# Patient Record
Sex: FEMALE | Race: WHITE | NOT HISPANIC OR LATINO | Employment: OTHER | URBAN - METROPOLITAN AREA
[De-identification: names, ages, dates, MRNs, and addresses within clinical notes are randomized per-mention and may not be internally consistent; named-entity substitution may affect disease eponyms.]

---

## 2018-09-25 ENCOUNTER — TRANSCRIBE ORDERS (OUTPATIENT)
Dept: ADMINISTRATIVE | Age: 63
End: 2018-09-25

## 2018-09-25 ENCOUNTER — APPOINTMENT (OUTPATIENT)
Dept: RADIOLOGY | Age: 63
End: 2018-09-25
Payer: COMMERCIAL

## 2018-09-25 DIAGNOSIS — Z11.1 SCREENING EXAMINATION FOR PULMONARY TUBERCULOSIS: Primary | ICD-10-CM

## 2018-09-25 DIAGNOSIS — Z11.1 SCREENING EXAMINATION FOR PULMONARY TUBERCULOSIS: ICD-10-CM

## 2018-09-25 PROCEDURE — 71046 X-RAY EXAM CHEST 2 VIEWS: CPT

## 2021-04-13 DIAGNOSIS — Z23 ENCOUNTER FOR IMMUNIZATION: ICD-10-CM

## 2023-01-22 PROBLEM — M18.0 PRIMARY OSTEOARTHRITIS OF BOTH FIRST CARPOMETACARPAL JOINTS: Status: ACTIVE | Noted: 2023-01-22

## 2023-01-22 PROBLEM — L40.0 PLAQUE PSORIASIS: Status: ACTIVE | Noted: 2023-01-22

## 2023-01-22 PROBLEM — M85.89 OSTEOPENIA OF MULTIPLE SITES: Status: ACTIVE | Noted: 2023-01-22

## 2023-01-22 PROBLEM — M06.09 SERONEGATIVE ARTHROPATHY OF MULTIPLE SITES (HCC): Status: ACTIVE | Noted: 2023-01-22

## 2023-01-22 PROBLEM — E03.8 HYPOTHYROIDISM DUE TO HASHIMOTO'S THYROIDITIS: Status: ACTIVE | Noted: 2023-01-22

## 2023-01-22 PROBLEM — M15.0 PRIMARY GENERALIZED (OSTEO)ARTHRITIS: Status: ACTIVE | Noted: 2023-01-22

## 2023-01-22 PROBLEM — K21.9 GASTROESOPHAGEAL REFLUX DISEASE WITHOUT ESOPHAGITIS: Status: ACTIVE | Noted: 2023-01-22

## 2023-01-22 PROBLEM — I10 PRIMARY HYPERTENSION: Status: ACTIVE | Noted: 2023-01-22

## 2023-01-22 PROBLEM — J30.89 ENVIRONMENTAL AND SEASONAL ALLERGIES: Status: ACTIVE | Noted: 2023-01-22

## 2023-01-22 PROBLEM — E06.3 HYPOTHYROIDISM DUE TO HASHIMOTO'S THYROIDITIS: Status: ACTIVE | Noted: 2023-01-22

## 2023-01-22 PROBLEM — E78.5 HYPERLIPIDEMIA: Status: ACTIVE | Noted: 2023-01-22

## 2023-01-22 PROBLEM — N39.3 URINARY, INCONTINENCE, STRESS FEMALE: Status: ACTIVE | Noted: 2023-01-22

## 2023-01-22 PROBLEM — G25.0 ESSENTIAL TREMOR: Status: ACTIVE | Noted: 2023-01-22

## 2023-01-22 PROBLEM — M47.812 CERVICAL SPONDYLOSIS WITHOUT MYELOPATHY: Status: ACTIVE | Noted: 2023-01-22

## 2023-01-22 PROBLEM — M35.00 SJOGREN'S SYNDROME WITHOUT EXTRAGLANDULAR INVOLVEMENT (HCC): Status: ACTIVE | Noted: 2023-01-22

## 2023-11-06 ENCOUNTER — OFFICE VISIT (OUTPATIENT)
Dept: OBGYN CLINIC | Facility: CLINIC | Age: 68
End: 2023-11-06
Payer: COMMERCIAL

## 2023-11-06 VITALS
BODY MASS INDEX: 24.99 KG/M2 | HEIGHT: 65 IN | SYSTOLIC BLOOD PRESSURE: 124 MMHG | WEIGHT: 150 LBS | DIASTOLIC BLOOD PRESSURE: 84 MMHG

## 2023-11-06 DIAGNOSIS — N83.201 RIGHT OVARIAN CYST: Primary | ICD-10-CM

## 2023-11-06 PROCEDURE — 99203 OFFICE O/P NEW LOW 30 MIN: CPT | Performed by: STUDENT IN AN ORGANIZED HEALTH CARE EDUCATION/TRAINING PROGRAM

## 2023-11-06 NOTE — PROGRESS NOTES
Assessment/Plan:     Problem List Items Addressed This Visit    None  Visit Diagnoses       Right ovarian cyst    -  Primary    Relevant Orders    Ambulatory Referral to Gynecologic Oncology          - reviewed potential etiologies of ovarian or paraovarian cyst in postmenopausal woman. Recommend removal. As I am not able to look at images from recently performed ultrasound, I cannot give her a more personalized opinion of her risk of malignancy. However, given size, cyst with "internal debris," I recommend gyn/onc referral and removal.     RTO routine gyn care     Subjective:      Patient ID: Nola Pisano is a 70 y.o. female. HPI  She presents today for discussion regarding paraovarian cyst, found during evaluation for pelvic pain. The following portions of the patient's history were reviewed and updated as appropriate: allergies, current medications, past family history, past medical history, past social history, past surgical history, and problem list.    Review of Systems  neg as reviewed    Objective:  /84 (BP Location: Left arm, Patient Position: Sitting, Cuff Size: Standard)   Ht 5' 4.5" (1.638 m)   Wt 68 kg (150 lb)   BMI 25.35 kg/m²      Physical Exam  Vitals reviewed. Constitutional:       Appearance: She is well-developed. HENT:      Head: Normocephalic. Cardiovascular:      Rate and Rhythm: Normal rate. Pulmonary:      Effort: Pulmonary effort is normal.   Musculoskeletal:         General: Normal range of motion. Cervical back: Normal range of motion. Neurological:      Mental Status: She is alert and oriented to person, place, and time.    Psychiatric:         Behavior: Behavior normal.

## 2023-11-07 ENCOUNTER — TELEPHONE (OUTPATIENT)
Dept: HEMATOLOGY ONCOLOGY | Facility: CLINIC | Age: 68
End: 2023-11-07

## 2023-11-07 NOTE — TELEPHONE ENCOUNTER
I called [facility] in response to a referral that was received for patient to establish care with Gynecologic Oncology. Outreach was made to schedule a consultation. I left a voicemail explaining the reason for my call and advised patient to call [facility] at [phone]. Another attempt will be made to contact patient.     7 days not needed

## 2023-11-07 NOTE — TELEPHONE ENCOUNTER
Appointment Schedule   Who are you speaking with? Patient   If it is not the patient, are they listed on an active communication consent form? N/A   Which provider is the appointment scheduled with? Dr. Corona Ellis   At which location is the appointment scheduled for? [place]   When is the appointment scheduled? Please list date and time 11/27/23 859   What is the reason for this appointment? consult   Did patient voice understanding of the details of this appointment? Yes   Was the no show policy reviewed with patient?  Yes

## 2023-11-22 ENCOUNTER — TELEPHONE (OUTPATIENT)
Dept: HEMATOLOGY ONCOLOGY | Facility: CLINIC | Age: 68
End: 2023-11-22

## 2023-11-27 ENCOUNTER — OFFICE VISIT (OUTPATIENT)
Dept: GYNECOLOGIC ONCOLOGY | Facility: CLINIC | Age: 68
End: 2023-11-27
Payer: COMMERCIAL

## 2023-11-27 VITALS
DIASTOLIC BLOOD PRESSURE: 86 MMHG | BODY MASS INDEX: 25.52 KG/M2 | HEART RATE: 102 BPM | SYSTOLIC BLOOD PRESSURE: 142 MMHG | TEMPERATURE: 99.5 F | OXYGEN SATURATION: 98 % | WEIGHT: 151 LBS

## 2023-11-27 DIAGNOSIS — R19.00 PELVIC MASS: Primary | ICD-10-CM

## 2023-11-27 DIAGNOSIS — N83.201 RIGHT OVARIAN CYST: ICD-10-CM

## 2023-11-27 PROCEDURE — 99205 OFFICE O/P NEW HI 60 MIN: CPT | Performed by: OBSTETRICS & GYNECOLOGY

## 2023-11-27 PROCEDURE — 87624 HPV HI-RISK TYP POOLED RSLT: CPT | Performed by: OBSTETRICS & GYNECOLOGY

## 2023-11-27 PROCEDURE — 88175 CYTOPATH C/V AUTO FLUID REDO: CPT | Performed by: OBSTETRICS & GYNECOLOGY

## 2023-11-27 RX ORDER — SODIUM CHLORIDE, SODIUM LACTATE, POTASSIUM CHLORIDE, CALCIUM CHLORIDE 600; 310; 30; 20 MG/100ML; MG/100ML; MG/100ML; MG/100ML
125 INJECTION, SOLUTION INTRAVENOUS CONTINUOUS
OUTPATIENT
Start: 2023-11-27

## 2023-11-27 RX ORDER — HEPARIN SODIUM 5000 [USP'U]/ML
5000 INJECTION, SOLUTION INTRAVENOUS; SUBCUTANEOUS
OUTPATIENT
Start: 2023-11-27 | End: 2023-11-28

## 2023-11-27 RX ORDER — ACETAMINOPHEN 325 MG/1
975 TABLET ORAL ONCE
OUTPATIENT
Start: 2023-11-27 | End: 2023-11-27

## 2023-11-27 RX ORDER — CEFAZOLIN SODIUM 1 G/50ML
1000 SOLUTION INTRAVENOUS ONCE
OUTPATIENT
Start: 2023-11-27 | End: 2023-11-27

## 2023-11-27 NOTE — PROGRESS NOTES
Assessment/Plan:    Problem List Items Addressed This Visit          Other    Pelvic mass - Primary     70year-old with an 8.2 x 4.9 cm right adnexal cyst.  There was debris in the cyst by ultrasound. Her performance status is 0.  1.  I discussed the differential diagnosis of the adnexal mass including benign, borderline, malignant etiologies. 2.  CT abdomen pelvis and  to evaluate for metastatic disease  3. I discussed the risks and benefits of robotic assisted total laparoscopic hysterectomy, bilateral salpingo-oophorectomy, intraoperative frozen section analysis with possible staging including omentectomy, peritoneal biopsies, lymphadenectomy. She understands the risks and benefits of the operation and agrees to proceed as outlined. Consent for surgery was obtained by me in the office. 4.  She understands that adjuvant therapy for malignancy is based on histology and stage but that chemotherapy is often recommended even for early stage disease. Thank you for the courtesy of this consultation. All questions were answered by the end of the visit. Relevant Orders    CBC and differential    Comprehensive metabolic panel        CT abdomen pelvis w contrast    Case request operating room: HYSTERECTOMY LAPAROSCOPIC TOTAL ([address] [place] [address]) W/ ROBOTICS (Completed)    Type and screen    HEMOGLOBIN A1C W/ EAG ESTIMATION    TSH    EKG 12 lead    XR chest pa & lateral    Liquid-based pap, diagnostic     Other Visit Diagnoses       Right ovarian cyst                    CHIEF COMPLAINT: Right adnexal mass        Patient ID: Mady Molina is a 70 y.o. female  Who presents as a consultation from Dr. Tone Mclaughlin to discuss treatment options for an incidentally identified pelvic mass. She was having diarrhea and had a work-up that included a ultrasound of the pelvis. This was done on 9/13/2023 and revealed the uterus to measure 6.9 x 4.9 cm with a 4 mm endometrial thickness. There was no free fluid.   There was a right incompletely visualized adnexal mass measuring 8.2 x 4.9 cm with debris in the cyst.  She does not have postmenopausal bleeding. She has not had a Pap smear. She does not have pelvic pain. She is able to perform her activities of daily living without difficulty. She stated she had a prior laparoscopy for a fimbrial cyst approximately 20 years ago. Review of Systems   Constitutional:  Negative for activity change and unexpected weight change. HENT: Negative. Eyes: Negative. Respiratory: Negative. Cardiovascular: Negative. Gastrointestinal:  Negative for abdominal distention and abdominal pain. Endocrine: Negative. Genitourinary:  Negative for pelvic pain and vaginal bleeding. Musculoskeletal: Negative. Skin: Negative. Allergic/Immunologic: Negative. Neurological: Negative. Hematological: Negative. Psychiatric/Behavioral: Negative.          Current Outpatient Medications   Medication Sig Dispense Refill    Ascorbic Acid (Vitamin C) 500 MG CAPS Take by mouth      B Complex Vitamins (B COMPLEX PO) Take by mouth      Boswellia-Glucosamine-Vit D (OSTEO BI-FLEX ONE PER DAY PO) Take by mouth      CALCIUM CITRATE PO Take by mouth      Cholecalciferol (Vitamin D-3) 125 MCG (5000 UT) TABS Take by mouth      levothyroxine 50 mcg tablet Take 50 mcg by mouth 3 times a week      Levothyroxine Sodium 75 MCG CAPS Take 3 capsules by mouth 4 times a week      Magnesium 250 MG TABS Take by mouth      MILK THISTLE PO Take by mouth      Omega-3 Fatty Acids (fish oil) 1,000 mg Take 2 capsules by mouth daily      Turmeric Curcumin 500 MG CAPS Take by mouth      Zinc 20 MG CAPS Take 40 mg by mouth      Acetylcysteine ( PO) Take by mouth (Patient not taking: Reported on 3/28/2023)      Alpha-Lipoic Acid (LIPOIC ACID PO) Take by mouth in the morning (Patient not taking: Reported on 10/31/2023)      fluticasone (FLONASE) 50 mcg/act nasal spray 2 sprays into each nostril daily (Patient not taking: Reported on 3/28/2023)      Steffanie, Zingiber officinalis, (STEFFANIE PO) Take by mouth (Patient not taking: Reported on 3/28/2023)      Neomycin-Polymyxin-Gramicidin (NEOSPORIN OP) Apply to eye (Patient not taking: Reported on 3/28/2023)       No current facility-administered medications for this visit. Allergies   Allergen Reactions    Ciprofloxacin Other (See Comments)     Nightmares    Dust Mite Extract Other (See Comments)    Molds & Smuts Other (See Comments)    Mushroom Extract Complex - Food Allergy Other (See Comments)    Shrimp Extract Allergy Skin Test - Food Allergy Other (See Comments)    Wheat Bran - Food Allergy Other (See Comments)       Past Medical History:   Diagnosis Date    Anxiety and depression     Dry eyes     Environmental and seasonal allergies     Hyperlipidemia     Hypertension     Hypothyroidism     Hashimoto's    Osteoarthritis     Osteopenia     Psoriasis     Scalp    Urinary, incontinence, stress female        Past Surgical History:   Procedure Laterality Date    ADENOIDECTOMY      APPENDECTOMY N/A     BREAST MASS EXCISION Left     Benign    INGUINAL HERNIA REPAIR Left     TUBAL LIGATION         OB History    No obstetric history on file.          Family History   Problem Relation Age of Onset    Cervical cancer Mother     Hypothyroidism Mother     Migraines Mother     Heart disease Mother     Gallbladder disease Mother     Peripheral vascular disease Mother     Arthritis Mother     Osteoporosis Mother     Stroke Father     Hypertension Father     Depression Sister     Cancer Sister         Bladder    Hypertension Brother     Hyperlipidemia Brother     Coronary artery disease Brother     Psoriasis Brother     Hypertension Brother     Hyperlipidemia Brother        The following portions of the patient's history were reviewed and updated as appropriate: allergies, current medications, past family history, past medical history, past social history, past surgical history, and problem list.      Objective:    Blood pressure 142/86, pulse 102, temperature 99.5 °F (37.5 °C), temperature source Temporal, weight 68.5 kg (151 lb), SpO2 98 %. Body mass index is 25.52 kg/m². Physical Exam  Vitals reviewed. Exam conducted with a chaperone present. Constitutional:       General: She is not in acute distress. Appearance: Normal appearance. She is well-developed and normal weight. She is not ill-appearing, toxic-appearing or diaphoretic. HENT:      Head: Normocephalic and atraumatic. Eyes:      General: No scleral icterus. Extraocular Movements: Extraocular movements intact. Conjunctiva/sclera: Conjunctivae normal.   Neck:      Thyroid: No thyromegaly. Cardiovascular:      Rate and Rhythm: Normal rate and regular rhythm. Pulmonary:      Effort: Pulmonary effort is normal.      Breath sounds: Normal breath sounds. Abdominal:      General: There is no distension. Palpations: Abdomen is soft. There is no mass. Tenderness: There is no abdominal tenderness. There is no guarding or rebound. Hernia: No hernia is present. Genitourinary:     Comments: The external female genitalia is normal. The bartholin's, uretheral and skenes glands are normal. The urethral meatus is normal (midline with no lesions). Anus without fissure or lesion. Speculum exam reveals vagina without lesion or discharge. Cervix is normal appearing without visible lesions. Pap smear obtained. No bleeding. No significant cystocele or rectocele noted. Bimanual exam notes a uterus with normal contour, mobility. No tenderness. Adnexa without masses or tenderness. Bladder is without fullness, mass or tenderness. Musculoskeletal:         General: No swelling or tenderness. Cervical back: Normal range of motion and neck supple. Right lower leg: No edema. Left lower leg: No edema. Lymphadenopathy:      Cervical: No cervical adenopathy.    Skin:     General: Skin is warm and dry. Coloration: Skin is not jaundiced or pale. Findings: No lesion or rash. Neurological:      General: No focal deficit present. Mental Status: She is alert and oriented to person, place, and time. Mental status is at baseline. Cranial Nerves: No cranial nerve deficit. Motor: No weakness. Gait: Gait normal.   Psychiatric:         Mood and Affect: Mood normal.         Behavior: Behavior normal.         Thought Content:  Thought content normal.         Judgment: Judgment normal.

## 2023-11-27 NOTE — PATIENT INSTRUCTIONS
1. Nothing to eat or drink after midnight prior to the operation. 2.  Please avoid ibuprofen, aspirin, fish oil for 7 days prior to surgery  3. You may take your thyroid medicine the morning of surgery with a sip of water. Do not take any other medications the morning of surgery.

## 2023-11-27 NOTE — ASSESSMENT & PLAN NOTE
70year-old with an 8.2 x 4.9 cm right adnexal cyst.  There was debris in the cyst by ultrasound. Her performance status is 0.  1.  I discussed the differential diagnosis of the adnexal mass including benign, borderline, malignant etiologies. 2.  CT abdomen pelvis and  to evaluate for metastatic disease  3. I discussed the risks and benefits of robotic assisted total laparoscopic hysterectomy, bilateral salpingo-oophorectomy, intraoperative frozen section analysis with possible staging including omentectomy, peritoneal biopsies, lymphadenectomy. She understands the risks and benefits of the operation and agrees to proceed as outlined. Consent for surgery was obtained by me in the office. 4.  She understands that adjuvant therapy for malignancy is based on histology and stage but that chemotherapy is often recommended even for early stage disease. Thank you for the courtesy of this consultation. All questions were answered by the end of the visit.

## 2023-11-29 LAB
HPV HR 12 DNA CVX QL NAA+PROBE: NEGATIVE
HPV16 DNA CVX QL NAA+PROBE: NEGATIVE
HPV18 DNA CVX QL NAA+PROBE: NEGATIVE

## 2023-12-01 ENCOUNTER — TELEPHONE (OUTPATIENT)
Dept: HEMATOLOGY ONCOLOGY | Facility: CLINIC | Age: 68
End: 2023-12-01

## 2023-12-01 NOTE — TELEPHONE ENCOUNTER
Patient Call    Who are you speaking with? Patient    If it is not the patient, are they listed on an active communication consent form? N/A   What is the reason for this call? Patient would like to speak to the office regarding some questions about her surgery and her CT scan results. Does this require a call back? Yes   If a call back is required, please list [facility] call back number [phone]   If a call back is required, advise that a message will be forwarded to their care team and someone will return their call as soon as possible. Did you relay this information to the patient?  Yes

## 2023-12-02 ENCOUNTER — HOSPITAL ENCOUNTER (EMERGENCY)
Facility: HOSPITAL | Age: 68
Discharge: HOME | End: 2023-12-02
Attending: EMERGENCY MEDICINE | Admitting: EMERGENCY MEDICINE
Payer: COMMERCIAL

## 2023-12-02 ENCOUNTER — APPOINTMENT (EMERGENCY)
Dept: RADIOLOGY | Facility: HOSPITAL | Age: 68
End: 2023-12-02
Payer: COMMERCIAL

## 2023-12-02 VITALS
OXYGEN SATURATION: 98 % | DIASTOLIC BLOOD PRESSURE: 80 MMHG | SYSTOLIC BLOOD PRESSURE: 123 MMHG | HEART RATE: 89 BPM | TEMPERATURE: 98.5 F | RESPIRATION RATE: 18 BRPM

## 2023-12-02 DIAGNOSIS — R10.9 ABDOMINAL PAIN: ICD-10-CM

## 2023-12-02 DIAGNOSIS — R07.9 CHEST PAIN: Primary | ICD-10-CM

## 2023-12-02 DIAGNOSIS — R13.10 DYSPHAGIA: ICD-10-CM

## 2023-12-02 LAB
ALBUMIN SERPL BCP-MCNC: 4.3 G/DL (ref 3.5–5)
ALP SERPL-CCNC: 50 U/L (ref 34–104)
ALT SERPL W P-5'-P-CCNC: 11 U/L (ref 7–52)
ANION GAP SERPL CALCULATED.3IONS-SCNC: 8 MMOL/L
AST SERPL W P-5'-P-CCNC: 14 U/L (ref 13–39)
BACTERIA UR QL AUTO: ABNORMAL /HPF
BASOPHILS # BLD AUTO: 0.04 THOUSANDS/ÂΜL (ref 0–0.1)
BASOPHILS NFR BLD AUTO: 0 % (ref 0–1)
BILIRUB SERPL-MCNC: 0.66 MG/DL (ref 0.2–1)
BILIRUB UR QL STRIP: NEGATIVE
BUN SERPL-MCNC: 14 MG/DL (ref 5–25)
CALCIUM SERPL-MCNC: 9.3 MG/DL (ref 8.4–10.2)
CARDIAC TROPONIN I PNL SERPL HS: 3 NG/L
CHLORIDE SERPL-SCNC: 98 MMOL/L (ref 96–108)
CLARITY UR: CLEAR
CO2 SERPL-SCNC: 26 MMOL/L (ref 21–32)
COLOR UR: YELLOW
CREAT SERPL-MCNC: 0.98 MG/DL (ref 0.6–1.3)
EOSINOPHIL # BLD AUTO: 0 THOUSAND/ÂΜL (ref 0–0.61)
EOSINOPHIL NFR BLD AUTO: 0 % (ref 0–6)
ERYTHROCYTE [DISTWIDTH] IN BLOOD BY AUTOMATED COUNT: 12.1 % (ref 11.6–15.1)
GFR SERPL CREATININE-BSD FRML MDRD: 59 ML/MIN/1.73SQ M
GLUCOSE SERPL-MCNC: 124 MG/DL (ref 65–140)
GLUCOSE UR STRIP-MCNC: NEGATIVE MG/DL
HCT VFR BLD AUTO: 39 % (ref 34.8–46.1)
HGB BLD-MCNC: 13 G/DL (ref 11.5–15.4)
HGB UR QL STRIP.AUTO: NEGATIVE
IMM GRANULOCYTES # BLD AUTO: 0.04 THOUSAND/UL (ref 0–0.2)
IMM GRANULOCYTES NFR BLD AUTO: 0 % (ref 0–2)
KETONES UR STRIP-MCNC: ABNORMAL MG/DL
LEUKOCYTE ESTERASE UR QL STRIP: NEGATIVE
LYMPHOCYTES # BLD AUTO: 0.93 THOUSANDS/ÂΜL (ref 0.6–4.47)
LYMPHOCYTES NFR BLD AUTO: 8 % (ref 14–44)
MCH RBC QN AUTO: 31.6 PG (ref 26.8–34.3)
MCHC RBC AUTO-ENTMCNC: 33.3 G/DL (ref 31.4–37.4)
MCV RBC AUTO: 95 FL (ref 82–98)
MONOCYTES # BLD AUTO: 0.59 THOUSAND/ÂΜL (ref 0.17–1.22)
MONOCYTES NFR BLD AUTO: 5 % (ref 4–12)
MUCOUS THREADS UR QL AUTO: ABNORMAL
NEUTROPHILS # BLD AUTO: 10.12 THOUSANDS/ÂΜL (ref 1.85–7.62)
NEUTS SEG NFR BLD AUTO: 87 % (ref 43–75)
NITRITE UR QL STRIP: NEGATIVE
NON-SQ EPI CELLS URNS QL MICRO: ABNORMAL /HPF
NRBC BLD AUTO-RTO: 0 /100 WBCS
PH UR STRIP.AUTO: 6 [PH]
PLATELET # BLD AUTO: 419 THOUSANDS/UL (ref 149–390)
PMV BLD AUTO: 9.3 FL (ref 8.9–12.7)
POTASSIUM SERPL-SCNC: 3.7 MMOL/L (ref 3.5–5.3)
PROT SERPL-MCNC: 7.8 G/DL (ref 6.4–8.4)
PROT UR STRIP-MCNC: ABNORMAL MG/DL
RBC # BLD AUTO: 4.11 MILLION/UL (ref 3.81–5.12)
RBC #/AREA URNS AUTO: ABNORMAL /HPF
SODIUM SERPL-SCNC: 132 MMOL/L (ref 135–147)
SP GR UR STRIP.AUTO: 1.02 (ref 1–1.03)
UROBILINOGEN UR STRIP-ACNC: <2 MG/DL
WBC # BLD AUTO: 11.72 THOUSAND/UL (ref 4.31–10.16)
WBC #/AREA URNS AUTO: ABNORMAL /HPF

## 2023-12-02 PROCEDURE — C9113 INJ PANTOPRAZOLE SODIUM, VIA: HCPCS

## 2023-12-02 PROCEDURE — 81001 URINALYSIS AUTO W/SCOPE: CPT

## 2023-12-02 PROCEDURE — 96374 THER/PROPH/DIAG INJ IV PUSH: CPT

## 2023-12-02 PROCEDURE — 36415 COLL VENOUS BLD VENIPUNCTURE: CPT

## 2023-12-02 PROCEDURE — 80053 COMPREHEN METABOLIC PANEL: CPT | Performed by: EMERGENCY MEDICINE

## 2023-12-02 PROCEDURE — 99285 EMERGENCY DEPT VISIT HI MDM: CPT | Performed by: EMERGENCY MEDICINE

## 2023-12-02 PROCEDURE — 93005 ELECTROCARDIOGRAM TRACING: CPT

## 2023-12-02 PROCEDURE — 84484 ASSAY OF TROPONIN QUANT: CPT | Performed by: EMERGENCY MEDICINE

## 2023-12-02 PROCEDURE — 99284 EMERGENCY DEPT VISIT MOD MDM: CPT

## 2023-12-02 PROCEDURE — 71046 X-RAY EXAM CHEST 2 VIEWS: CPT

## 2023-12-02 PROCEDURE — 85025 COMPLETE CBC W/AUTO DIFF WBC: CPT | Performed by: EMERGENCY MEDICINE

## 2023-12-02 RX ORDER — SUCRALFATE ORAL 1 G/10ML
1 SUSPENSION ORAL ONCE
Status: DISCONTINUED | OUTPATIENT
Start: 2023-12-02 | End: 2023-12-02

## 2023-12-02 RX ORDER — PANTOPRAZOLE SODIUM 40 MG/10ML
40 INJECTION, POWDER, LYOPHILIZED, FOR SOLUTION INTRAVENOUS ONCE
Status: COMPLETED | OUTPATIENT
Start: 2023-12-02 | End: 2023-12-02

## 2023-12-02 RX ORDER — OMEPRAZOLE 20 MG/1
20 CAPSULE, DELAYED RELEASE ORAL DAILY
Qty: 7 CAPSULE | Refills: 0 | Status: SHIPPED | OUTPATIENT
Start: 2023-12-02

## 2023-12-02 RX ORDER — SUCRALFATE 1 G/1
1 TABLET ORAL ONCE
Status: COMPLETED | OUTPATIENT
Start: 2023-12-02 | End: 2023-12-02

## 2023-12-02 RX ORDER — SUCRALFATE 1 G/1
1 TABLET ORAL 4 TIMES DAILY PRN
Qty: 12 TABLET | Refills: 0 | Status: SHIPPED | OUTPATIENT
Start: 2023-12-02 | End: 2023-12-07

## 2023-12-02 RX ADMIN — SUCRALFATE 1 G: 1 TABLET ORAL at 15:44

## 2023-12-02 RX ADMIN — PANTOPRAZOLE SODIUM 40 MG: 40 INJECTION, POWDER, FOR SOLUTION INTRAVENOUS at 15:44

## 2023-12-02 NOTE — DISCHARGE INSTRUCTIONS
Please call your GI doctor on Monday to set up an appointment. I have also put in a referral to our system for them to call you and make an appointment, if you are unable to contact your physician. Please return to the ED if you experience worsening chest pain, difficulty breathing, or confusion. Also given a referral to cardiology due to having some increased risk factors for potential heart problems in the future. Please contact cardiology to set up an appointment.

## 2023-12-02 NOTE — ED ATTENDING ATTESTATION
12/2/2023  I, Sanford Cesar MD, saw and evaluated the patient. I have discussed the patient with the resident/non-physician practitioner and agree with the resident's/non-physician practitioner's findings, Plan of Care, and MDM as documented in the resident's/non-physician practitioner's note, except where noted. All available labs and Radiology studies were reviewed. I was present for key portions of any procedure(s) performed by the resident/non-physician practitioner and I was immediately available to provide assistance. At this point I agree with the current assessment done in the Emergency Department. I have conducted an independent evaluation of this patient a history and physical is as follows:    History    Patient is a 16-year-old female, with a history significant for anxiety, hypertension, hyperlipidemia, who presents to the ED today due to epigastric abdominal pain/substernal chest pain that has been present, atraumatically and intermittently, over the last month. Patient describes this as a burning/gnawing sensation that is exacerbated by laying flat as well as by eating acidic foods. This discomfort radiates up her chest when particularly severe. Patient has taken Pepcid to remit her symptoms. Patient denies frequent NSAID use. Patient denies fever, dyspnea, history of VTE, hemoptysis, recent trauma or surgery, melena, hematochezia, hematemesis, unintentional weight loss, diarrhea since her most recent bout was treated. Patient does, however, report dysphagia to solids but not liquids. Patient believes that the Pepcid has helped with her symptoms since beginning this roughly 1 week ago. There is associated bloating/flatulence/burping. Patient also describes polyuria without dysuria. Patient states that, in between episodes, the pain is completely resolved. Patient is without other concerns at this time.      ROS  Patient denies: Fever; vision change;dyspnea; dysuria; rash; weakness; numbness; difficulty walking; confusion    Physical Exam    GENERAL APPEARANCE: NAD  NEURO: Patient is speaking clearly in complete sentences. Patient is answering appropriately and able follow commands. Patient is moving all four extremities spontaneously. No facial droop. Tongue midline. HEENT: PERRL, Moist mucous membranes, external ears normal, nose normal  Neck: No cervical adenopathy  CV: RRR. No murmurs, rubs, gallops  LUNGS: Clear to auscultation: No wheezes, stridor, rhonchi, rales  GI: Abdomen non-distended. Soft. Non-tender and without rebound or guarding. Negative Kaur sign. : Deferred at this time  MSK: No deformity. No lower extremity edema. No pain with calf squeeze. Skin: Warm and dry  Capillary refill: <2 seconds    Assessment/Plan  Abdominal pain, chest pain, dysphagia, polyuria  -Concern for GERD versus Loredo's esophagus versus gastritis versus peptic ulcer disease versus ACS versus anemia versus electrolyte abnormality versus cancer versus UTI. -Will investigate with cardiac workup, UA  -Will manage with Protonix, Carafate, further based upon workup. Patient advised to follow-up with her gastroenterologist this coming Monday    ED Course  ED Course as of 12/02/23 1609   Sat Dec 02, 2023   1456 hs TnI 0hr: 3  WNL   1456 Sodium(!): 132  Low, similar to prior    1457 Hemoglobin: 13.0  WNL, similar to prior per my review of the medical record    1457 ECG per my independent interpretation: Tachycardic rate, regular rhythm, no ectopy, normal axis, no ST elevations or depressions     1603 On reevaluation, patient reports significant improvement in pain at this time and there is no pleuritic component at this time.    1605 Bacteria, UA: None Seen  WNL   1605 hs TnI 0hr: 3  Within normal limits, as pain has been present for over 3 hours, no need for delta         Critical Care Time  Procedures

## 2023-12-02 NOTE — ED PROVIDER NOTES
History  Chief Complaint   Patient presents with    Heartburn     Pt c/o midsternal "burning" pain since November. Taking pepcid and simethicone at home without relief. +bloating and belching      Ms. Rossi Castillo is a 79-year-old female with a past medical history of anxiety, HLD, HTN, hypothyroidism, osteoarthritis, and psoriasis who presents to the ED for chest pain. Patient reports that for about the past month she has had what she believes to be acid reflux. She reports that she has a lot of belching, and on days when she has belching and is bloated she almost always gets significant chest pain when she lays down to go to sleep. She describes the pain as a burning sensation. She reports that if she sits up straight at 90 degrees the pain stops worsening. And then eventually after several hours the pain will lessen as long as she stays sitting up. Due to this she has lost quite a bit of sleep. She reports that roughly a week ago she began taking Pepcid, 20 mg twice daily. Reports that she had some initial symptom relief with use of Pepcid, but reports that she feels that her symptoms are continuing to worsen. Notes that she has some dysphagia with solid foods, but no difficulties with liquids. Prior to Admission Medications   Prescriptions Last Dose Informant Patient Reported? Taking?    Acetylcysteine ( PO)  Self Yes No   Sig: Take by mouth   Patient not taking: Reported on 3/28/2023   Alpha-Lipoic Acid (LIPOIC ACID PO)  Self Yes No   Sig: Take by mouth in the morning   Patient not taking: Reported on 10/31/2023   Ascorbic Acid (Vitamin C) 500 MG CAPS  Self Yes No   Sig: Take by mouth   B Complex Vitamins (B COMPLEX PO)  Self Yes No   Sig: Take by mouth   Boswellia-Glucosamine-Vit D (OSTEO BI-FLEX ONE PER DAY PO)  Self Yes No   Sig: Take by mouth   CALCIUM CITRATE PO  Self Yes No   Sig: Take by mouth   Cholecalciferol (Vitamin D-3) 125 MCG (5000 UT) TABS  Self Yes No   Sig: Take by mouth Steffanie, Zingiber officinalis, (STEFFANIE PO)  Self Yes No   Sig: Take by mouth   Patient not taking: Reported on 3/28/2023   Levothyroxine Sodium 75 MCG CAPS  Self Yes No   Sig: Take 3 capsules by mouth 4 times a week   MILK THISTLE PO  Self Yes No   Sig: Take by mouth   Magnesium 250 MG TABS  Self Yes No   Sig: Take by mouth   Neomycin-Polymyxin-Gramicidin (NEOSPORIN OP)  Self Yes No   Sig: Apply to eye   Patient not taking: Reported on 3/28/2023   Omega-3 Fatty Acids (fish oil) 1,000 mg  Self Yes No   Sig: Take 2 capsules by mouth daily   Turmeric Curcumin 500 MG CAPS  Self Yes No   Sig: Take by mouth   Zinc 20 MG CAPS  Self Yes No   Sig: Take 40 mg by mouth   fluticasone (FLONASE) 50 mcg/act nasal spray  Self Yes No   Si sprays into each nostril daily   Patient not taking: Reported on 3/28/2023   levothyroxine 50 mcg tablet  Self Yes No   Sig: Take 50 mcg by mouth 3 times a week      Facility-Administered Medications: None       Past Medical History:   Diagnosis Date    Anxiety and depression     Dry eyes     Environmental and seasonal allergies     Hyperlipidemia     Hypertension     Hypothyroidism     Hashimoto's    Osteoarthritis     Osteopenia     Psoriasis     Scalp    Urinary, incontinence, stress female        Past Surgical History:   Procedure Laterality Date    ADENOIDECTOMY      APPENDECTOMY N/A     BREAST MASS EXCISION Left     Benign    INGUINAL HERNIA REPAIR Left     TUBAL LIGATION         Family History   Problem Relation Age of Onset    Cervical cancer Mother     Hypothyroidism Mother     Migraines Mother     Heart disease Mother     Gallbladder disease Mother     Peripheral vascular disease Mother     Arthritis Mother     Osteoporosis Mother     Stroke Father     Hypertension Father     Depression Sister     Cancer Sister         Bladder    Hypertension Brother     Hyperlipidemia Brother     Coronary artery disease Brother     Psoriasis Brother     Hypertension Brother     Hyperlipidemia Brother      I have reviewed and agree with the history as documented. E-Cigarette/Vaping    E-Cigarette Use Never User      E-Cigarette/Vaping Substances     Social History     Tobacco Use    Smoking status: Never    Smokeless tobacco: Never   Vaping Use    Vaping Use: Never used   Substance Use Topics    Alcohol use: Yes     Comment: Rarely        Review of Systems   Constitutional:  Negative for activity change, chills and fever. HENT:  Positive for trouble swallowing. Negative for sore throat. Eyes:  Negative for pain and visual disturbance. Respiratory:  Negative for chest tightness and shortness of breath. Cardiovascular:  Positive for chest pain. Gastrointestinal:  Positive for abdominal pain. Negative for constipation, diarrhea, nausea and vomiting. Genitourinary:  Negative for dysuria and hematuria. Skin:  Negative for rash and wound. Neurological:  Negative for dizziness, syncope, light-headedness and headaches. Psychiatric/Behavioral: Negative. Physical Exam  ED Triage Vitals [12/02/23 1206]   Temperature Pulse Respirations Blood Pressure SpO2   98.5 °F (36.9 °C) 102 18 123/80 98 %      Temp Source Heart Rate Source Patient Position - Orthostatic VS BP Location FiO2 (%)   Oral Monitor Sitting Right arm --      Pain Score       5             Orthostatic Vital Signs  Vitals:    12/02/23 1206 12/02/23 1608   BP: 123/80    Pulse: 102 89   Patient Position - Orthostatic VS: Sitting        Physical Exam  Vitals and nursing note reviewed. Constitutional:       Appearance: Normal appearance. HENT:      Head: Normocephalic and atraumatic. Right Ear: External ear normal.      Left Ear: External ear normal.      Nose: Nose normal.      Mouth/Throat:      Mouth: Mucous membranes are moist.      Pharynx: Oropharynx is clear. Eyes:      Extraocular Movements: Extraocular movements intact.       Conjunctiva/sclera: Conjunctivae normal.   Cardiovascular:      Rate and Rhythm: Normal rate and regular rhythm. Heart sounds: Normal heart sounds. Pulmonary:      Effort: Pulmonary effort is normal.      Breath sounds: Normal breath sounds. Abdominal:      General: Abdomen is flat. There is no distension. Tenderness: There is abdominal tenderness in the epigastric area. Hernia: No hernia is present. Musculoskeletal:         General: Normal range of motion. Cervical back: Normal range of motion and neck supple. Skin:     General: Skin is warm and dry. Neurological:      General: No focal deficit present. Mental Status: She is alert and oriented to person, place, and time.    Psychiatric:         Mood and Affect: Mood normal.         Behavior: Behavior normal.         ED Medications  Medications   pantoprazole (PROTONIX) injection 40 mg (40 mg Intravenous Given 12/2/23 1544)   sucralfate (CARAFATE) tablet 1 g (1 g Oral Given 12/2/23 1544)       Diagnostic Studies  Results Reviewed       Procedure Component Value Units Date/Time    Urine Microscopic [[id number]]  (Abnormal) Collected: 12/02/23 1547    Lab Status: Final result Specimen: Urine, Clean Catch Updated: 12/02/23 1601     RBC, UA None Seen /hpf      WBC, UA 1-2 /hpf      Epithelial Cells Occasional /hpf      Bacteria, UA None Seen /hpf      MUCUS THREADS Occasional    UA w Reflex to Microscopic w Reflex to Culture [[id number]]  (Abnormal) Collected: 12/02/23 1547    Lab Status: Final result Specimen: Urine, Clean Catch Updated: 12/02/23 1557     Color, UA Yellow     Clarity, UA Clear     Specific Gravity, UA 1.022     pH, UA 6.0     Leukocytes, UA Negative     Nitrite, UA Negative     Protein, UA Trace mg/dl      Glucose, UA Negative mg/dl      Ketones, UA Trace mg/dl      Urobilinogen, UA <2.0 mg/dl      Bilirubin, UA Negative     Occult Blood, UA Negative    HS Troponin 0hr (reflex protocol) [[id number]]  (Normal) Collected: 12/02/23 1218    Lab Status: Final result Specimen: Blood from Arm, Left Updated: 12/02/23 1253     hs TnI 0hr 3 ng/L     Comprehensive metabolic panel [[id number]]  (Abnormal) Collected: 12/02/23 1218    Lab Status: Final result Specimen: Blood from Arm, Left Updated: 12/02/23 1246     Sodium 132 mmol/L      Potassium 3.7 mmol/L      Chloride 98 mmol/L      CO2 26 mmol/L      ANION GAP 8 mmol/L      BUN 14 mg/dL      Creatinine 0.98 mg/dL      Glucose 124 mg/dL      Calcium 9.3 mg/dL      AST 14 U/L      ALT 11 U/L      Alkaline Phosphatase 50 U/L      Total Protein 7.8 g/dL      Albumin 4.3 g/dL      Total Bilirubin 0.66 mg/dL      eGFR 59 ml/min/1.73sq m     Narrative:      [place] guidelines for Chronic Kidney Disease (CKD):     Stage 1 with normal or high GFR (GFR > 90 mL/min/1.73 square meters)    Stage 2 Mild CKD (GFR = 60-89 mL/min/1.73 square meters)    Stage 3A Moderate CKD (GFR = 45-59 mL/min/1.73 square meters)    Stage 3B Moderate CKD (GFR = 30-44 mL/min/1.73 square meters)    Stage 4 Severe CKD (GFR = 15-29 mL/min/1.73 square meters)    Stage 5 End Stage CKD (GFR <15 mL/min/1.73 square meters)  Note: GFR calculation is accurate only with a steady state creatinine    CBC and differential [[id number]]  (Abnormal) Collected: 12/02/23 1218    Lab Status: Final result Specimen: Blood from Arm, Left Updated: 12/02/23 1238     WBC 11.72 Thousand/uL      RBC 4.11 Million/uL      Hemoglobin 13.0 g/dL      Hematocrit 39.0 %      MCV 95 fL      MCH 31.6 pg      MCHC 33.3 g/dL      RDW 12.1 %      MPV 9.3 fL      Platelets 275 Thousands/uL      nRBC 0 /100 WBCs      Neutrophils Relative 87 %      Immat GRANS % 0 %      Lymphocytes Relative 8 %      Monocytes Relative 5 %      Eosinophils Relative 0 %      Basophils Relative 0 %      Neutrophils Absolute 10.12 Thousands/µL      Immature Grans Absolute 0.04 Thousand/uL      Lymphocytes Absolute 0.93 Thousands/µL      Monocytes Absolute 0.59 Thousand/µL      Eosinophils Absolute 0.00 Thousand/µL      Basophils Absolute 0.04 Thousands/µL                    XR chest 2 views   ED Interpretation by Jaylene June MD (12/02 1610)   Per my independent interpretation: No acute cardiopulmonary process            Procedures  ECG 12 Lead Documentation Only    Date/Time: 12/2/2023 3:33 PM    Performed by: Pina Bullock MD  Authorized by: Pina Bullock MD    Indications / Diagnosis:  Chest pain  ECG reviewed by me, the ED Provider: yes    Patient location:  ED  Previous ECG:     Previous ECG:  Compared to current    Comparison ECG info:  8/27/09    Similarity:  Changes noted (Loss of T waves)  Interpretation:     Interpretation: non-specific    Rate:     ECG rate:  106    ECG rate assessment: tachycardic    Rhythm:     Rhythm: sinus tachycardia    Ectopy:     Ectopy: none    QRS:     QRS axis:  Normal    QRS intervals:  Normal  Conduction:     Conduction: normal    ST segments:     ST segments:  Normal  T waves:     T waves: flattening          ED Course  ED Course as of 12/02/23 1654   Sat Dec 02, 2023   1539 hs TnI 0hr: 3  Second troponin is not indicated as patient has been having this pain for over 3 hours. 1603 Bacteria, UA: None Seen   1603 Leukocytes, UA: Negative   1603 Nitrite, UA: Negative             HEART Risk Score      Flowsheet Row Most Recent Value   Heart Score Risk Calculator    History 1 Filed at: 12/02/2023 1556   ECG 1 Filed at: 12/02/2023 1556   Age 2 Filed at: 12/02/2023 1556   Risk Factors 1 Filed at: 12/02/2023 1556   Troponin 0 Filed at: 12/02/2023 1556   HEART Score 5 Filed at: 12/02/2023 1556                                  Medical Decision Making  Ms. Yocasta Sneed is a 25-year-old female with a past medical history of anxiety, HLD, HTN, hypothyroidism, osteoarthritis, and psoriasis who presents to the ED for chest pain. Based on history and physical DDx includes but is not limited to: GERD exacerbation, ACS, MI, EOE, Loredo's esophagus.     Labs: Largely unremarkable,  CXR: No acute cardiac, pulmonary, or osseous processes noted. EKG: as documented, abn but not concerning for an MI   please see ED course for additional thoughts or medical decision making. Current leading diagnosis is a GERD exacerbation. Patient reports significant decrease in her pain with the pantoprazole and sucralfate. Advised patient to follow-up with her GI physician in regards to this ongoing pain. Patient is aware that chronic acid exposure to the esophagus can increase her risk of cancer. Also recommended the patient follow-up with cardiology due to her heart score of 5. She reports understanding and is willing to go see them. Results shared with patient and her . Return precautions given and patient expresses understanding and is comfortable with discharge. Amount and/or Complexity of Data Reviewed  Labs: ordered. Decision-making details documented in ED Course. Radiology: ordered and independent interpretation performed. Risk  Prescription drug management. Disposition  Final diagnoses:   Chest pain   Abdominal pain   Dysphagia     Time reflects when diagnosis was documented in both MDM as applicable and the Disposition within this note       Time User Action Codes Description Comment    12/2/2023  3:55 PM Reina Mendez Add [R07.9] Chest pain     12/2/2023  3:55 PM Renford Castleman [R10.9] Abdominal pain     12/2/2023  3:56 PM Reina Mendez Add [R13.10] Dysphagia           ED Disposition       ED Disposition   Discharge    Condition   Stable    Date/Time   Sat Dec 2, 2023 [address] discharge to home/self care.                    Follow-up Information       Follow up With Specialties Details Why Contact Info Additional [address] [facility] [place] [facility] Schedule an appointment as soon as possible for a visit   [address] [place] [address] [address] [employer] [facility] [place] [address] [facility] [address] [place] [address], [place]), [place], [phone]    [facility] [address] [place] [facility] Schedule an appointment as soon as possible for a visit   [address] [place] [facility] [address] [place], [address] [place], [place], [place]            Discharge Medication List as of 12/2/2023  4:11 PM        START taking these medications    Details   omeprazole (PriLOSEC) 20 mg delayed release capsule Take 1 capsule (20 mg total) by mouth daily, Starting Sat 12/2/2023, Normal      sucralfate (CARAFATE) 1 g tablet Take 1 tablet (1 g total) by mouth 4 (four) times a day as needed (Take if you experience chest pain) for up to 5 days, Starting Sat 12/2/2023, Until u 12/7/2023 at 2359, Normal           CONTINUE these medications which have NOT CHANGED    Details   Acetylcysteine ( PO) Take by mouth, Historical Med      Alpha-Lipoic Acid (LIPOIC ACID PO) Take by mouth in the morning, Historical Med      Ascorbic Acid (Vitamin C) 500 MG CAPS Take by mouth, Historical Med      B Complex Vitamins (B COMPLEX PO) Take by mouth, Historical Med      Boswellia-Glucosamine-Vit D (OSTEO BI-FLEX ONE PER DAY PO) Take by mouth, Historical Med      CALCIUM CITRATE PO Take by mouth, Historical Med      Cholecalciferol (Vitamin D-3) 125 MCG (5000 UT) TABS Take by mouth, Historical Med      fluticasone (FLONASE) 50 mcg/act nasal spray 2 sprays into each nostril daily, Historical Med      Ginger, Zingiber officinalis, (GINGER PO) Take by mouth, Historical Med      levothyroxine 50 mcg tablet Take 50 mcg by mouth 3 times a week, Historical Med      Levothyroxine Sodium 75 MCG CAPS Take 3 capsules by mouth 4 times a week, Historical Med      Magnesium 250 MG TABS Take by mouth, Historical Med      MILK THISTLE PO Take by mouth, Historical Med      Neomycin-Polymyxin-Gramicidin (NEOSPORIN OP) Apply to eye, Historical Med      Omega-3 Fatty Acids (fish oil) 1,000 mg Take 2 capsules by mouth daily, Historical Med      Turmeric Curcumin 500 MG CAPS Take by mouth, Historical Med      Zinc 20 MG CAPS Take 40 mg by mouth, Historical Med               PDMP Review       None             ED Provider  Attending physically available and evaluated Brisa Catalan. I managed the patient along with the ED Attending.     Electronically Signed by           Lashonda Eagle MD  12/02/23 3661

## 2023-12-03 LAB
ATRIAL RATE: 106 BPM
P AXIS: 50 DEGREES
PR INTERVAL: 146 MS
QRS AXIS: -1 DEGREES
QRSD INTERVAL: 70 MS
QT INTERVAL: 296 MS
QTC INTERVAL: 393 MS
T WAVE AXIS: 24 DEGREES
VENTRICULAR RATE: 106 BPM

## 2023-12-06 LAB
LAB AP GYN PRIMARY INTERPRETATION: NORMAL
Lab: NORMAL

## 2023-12-08 ENCOUNTER — HOSPITAL ENCOUNTER (OUTPATIENT)
Dept: RADIOLOGY | Age: 68
Discharge: HOME | End: 2023-12-08
Payer: COMMERCIAL

## 2023-12-08 DIAGNOSIS — R19.00 PELVIC MASS: ICD-10-CM

## 2023-12-08 PROCEDURE — G1004 CDSM NDSC: HCPCS

## 2023-12-08 PROCEDURE — 74177 CT ABD & PELVIS W/CONTRAST: CPT

## 2023-12-08 RX ADMIN — IOHEXOL 50 ML: 240 INJECTION, SOLUTION INTRATHECAL; INTRAVASCULAR; INTRAVENOUS; ORAL at 09:57

## 2023-12-08 RX ADMIN — IOHEXOL 100 ML: 350 INJECTION, SOLUTION INTRAVENOUS at 09:57

## 2023-12-13 RX ORDER — LEVOTHYROXINE SODIUM 0.12 MG/1
TABLET ORAL
COMMUNITY
Start: 2023-11-22

## 2023-12-15 ENCOUNTER — OFFICE VISIT (OUTPATIENT)
Dept: INTERNAL MEDICINE CLINIC | Facility: CLINIC | Age: 68
End: 2023-12-15
Payer: COMMERCIAL

## 2023-12-15 VITALS
WEIGHT: 145 LBS | DIASTOLIC BLOOD PRESSURE: 82 MMHG | BODY MASS INDEX: 24.75 KG/M2 | SYSTOLIC BLOOD PRESSURE: 126 MMHG | HEART RATE: 79 BPM | HEIGHT: 64 IN | OXYGEN SATURATION: 99 %

## 2023-12-15 DIAGNOSIS — R19.00 PELVIC MASS: Primary | ICD-10-CM

## 2023-12-15 DIAGNOSIS — M35.00 SJOGREN'S SYNDROME WITHOUT EXTRAGLANDULAR INVOLVEMENT (HCC): ICD-10-CM

## 2023-12-15 DIAGNOSIS — K21.9 GASTROESOPHAGEAL REFLUX DISEASE WITHOUT ESOPHAGITIS: ICD-10-CM

## 2023-12-15 DIAGNOSIS — E03.8 HYPOTHYROIDISM DUE TO HASHIMOTO'S THYROIDITIS: ICD-10-CM

## 2023-12-15 DIAGNOSIS — I10 PRIMARY HYPERTENSION: ICD-10-CM

## 2023-12-15 DIAGNOSIS — M06.09 SERONEGATIVE ARTHROPATHY OF MULTIPLE SITES (HCC): ICD-10-CM

## 2023-12-15 DIAGNOSIS — E06.3 HYPOTHYROIDISM DUE TO HASHIMOTO'S THYROIDITIS: ICD-10-CM

## 2023-12-15 DIAGNOSIS — Z00.00 ANNUAL PHYSICAL EXAM: ICD-10-CM

## 2023-12-15 DIAGNOSIS — Z12.31 ENCOUNTER FOR SCREENING MAMMOGRAM FOR BREAST CANCER: ICD-10-CM

## 2023-12-15 PROCEDURE — 99387 INIT PM E/M NEW PAT 65+ YRS: CPT | Performed by: INTERNAL MEDICINE

## 2023-12-15 PROCEDURE — 99386 PREV VISIT NEW AGE 40-64: CPT | Performed by: INTERNAL MEDICINE

## 2023-12-15 NOTE — PROGRESS NOTES
[address] [facility]    NAME: Katelyn Dumont  AGE: 70 y.o. SEX: female  : 1955     DATE: 12/15/2023     Assessment and Plan:     Problem List Items Addressed This Visit     Gastroesophageal reflux disease without esophagitis     -Stable  -Has an upcoming EGD with GI         Hypothyroidism due to Hashimoto's thyroiditis     -TSH within normal range  -Appreciate endocrinology follow-up         Relevant Medications    levothyroxine 125 mcg tablet    Primary hypertension     -Blood pressure well controlled  off of medications  -Continue to monitor         Seronegative arthropathy of multiple sites ([address])     -Pain currently controlled  -Followed by rheumatology         Sjogren's syndrome without extraglandular involvement ([address])    Pelvic mass - Primary     -Scheduled for total hysterectomy with bilateral salpingo-oophorectomy with Gyn/Onc        Other Visit Diagnoses     Annual physical exam        Encounter for screening mammogram for breast cancer        Relevant Orders    Mammo screening bilateral w 3d & cad          Immunizations and preventive care screenings were discussed with patient today. Appropriate education was printed on patient's after visit summary. Depression Screening and Follow-up Plan: Patient was screened for depression during today's encounter. They screened negative with a PHQ-2 score of 0. Return in about 4 months (around 4/15/2024). Chief Complaint:     Chief Complaint   Patient presents with   • Establish Care      History of Present Illness:     Adult Annual Physical   Patient here for a comprehensive physical exam and to establish care. Her past medical history is most notable for a pelvic mass for which she is being followed by Gyn/Onc. She reports she is scheduled for a total hysterectomy with ovary removal next month. Her history is also notable for hypothyroidism.   She is currently followed by Dr. Mayra Davis of endocrinology at [facility]. She reports she also has a history of GERD. She is currently on omeprazole 20 mg daily. She states in the upcoming months she has an EGD and colonoscopy scheduled with GI. She also has a history of seronegative arthropathy/Sjogren's/psoriasis. She reports this is stable. She is currently followed by Dr. Keshawn Baird. Depression Screening  PHQ-2/9 Depression Screening    Little interest or pleasure in doing things: 0 - not at all  Feeling down, depressed, or hopeless: 0 - not at all  PHQ-2 Score: 0  PHQ-2 Interpretation: Negative depression screen          Review of Systems:     Review of Systems  All other systems negative except for pertinent findings noted in HPI.       Past Medical History:     Past Medical History:   Diagnosis Date   • Anxiety and depression    • Dry eyes    • Environmental and seasonal allergies    • Hyperlipidemia    • Hypertension    • Hypothyroidism     Hashimoto's   • Osteoarthritis    • Osteopenia    • Psoriasis     Scalp   • Urinary, incontinence, stress female       Past Surgical History:     Past Surgical History:   Procedure Laterality Date   • ADENOIDECTOMY     • APPENDECTOMY N/A    • BREAST MASS EXCISION Left     Benign   • INGUINAL HERNIA REPAIR Left    • TUBAL LIGATION        Social History:     Social History     Socioeconomic History   • Marital status: /Civil Union     Spouse name: None   • Number of children: None   • Years of education: None   • Highest education level: None   Occupational History   • None   Tobacco Use   • Smoking status: Never   • Smokeless tobacco: Never   Vaping Use   • Vaping status: Never Used   Substance and Sexual Activity   • Alcohol use: Yes     Comment: Rarely   • Drug use: None   • Sexual activity: None   Other Topics Concern   • None   Social History Narrative   • None     Social Determinants of Health     Financial Resource Strain: Not on file   Food Insecurity: Not on file   Transportation Needs: Not on file   Physical Activity: Not on file   Stress: Not on file   Social Connections: Not on file   Intimate Partner Violence: Not on file   Housing Stability: Not on file      Family History:     Family History   Problem Relation Age of Onset   • Cervical cancer Mother    • Hypothyroidism Mother    • Migraines Mother    • Heart disease Mother    • Gallbladder disease Mother    • Peripheral vascular disease Mother    • Arthritis Mother    • Osteoporosis Mother    • Stroke Father    • Hypertension Father    • Depression Sister    • Cancer Sister         Bladder   • Hypertension Brother    • Hyperlipidemia Brother    • Coronary artery disease Brother    • Psoriasis Brother    • Hypertension Brother    • Hyperlipidemia Brother       Current Medications:     Current Outpatient Medications   Medication Sig Dispense Refill   • Ascorbic Acid (Vitamin C) 500 MG CAPS Take by mouth     • B Complex Vitamins (B COMPLEX PO) Take by mouth     • Boswellia-Glucosamine-Vit D (OSTEO BI-FLEX ONE PER DAY PO) Take by mouth     • CALCIUM CITRATE PO Take by mouth     • Cholecalciferol (Vitamin D-3) 125 MCG (5000 UT) TABS Take by mouth     • levothyroxine 125 mcg tablet TAKE 1/2 TABLET (62.5 MCG) BY MOUTH ONCE DAILY. • Magnesium 250 MG TABS Take by mouth     • MILK THISTLE PO Take by mouth     • Omega-3 Fatty Acids (fish oil) 1,000 mg Take 2 capsules by mouth daily     • omeprazole (PriLOSEC) 20 mg delayed release capsule Take 1 capsule (20 mg total) by mouth daily 7 capsule 0   • sucralfate (CARAFATE) 1 g tablet Take 1 tablet (1 g total) by mouth 4 (four) times a day as needed (Take if you experience chest pain) for up to 5 days 12 tablet 0   • Turmeric Curcumin 500 MG CAPS Take by mouth     • Zinc 20 MG CAPS Take 40 mg by mouth       No current facility-administered medications for this visit. Allergies:      Allergies   Allergen Reactions   • Ciprofloxacin Other (See Comments) Nightmares   • Dust Mite Extract Other (See Comments)   • Molds & Smuts Other (See Comments)   • Mushroom Extract Complex - Food Allergy Other (See Comments)   • Shrimp Extract Allergy Skin Test - Food Allergy Other (See Comments)   • Wheat Bran - Food Allergy Other (See Comments)      Physical Exam:     /82   Pulse 79   Ht 5' 4" (1.626 m)   Wt 65.8 kg (145 lb)   SpO2 99%   BMI 24.89 kg/m²     Physical Exam   General: NAD  HEENT: NCAT, EOMI, normal conjunctiva  Cardiovascular: RRR, normal S1 and S2, no m/r/g  Pulmonary: Normal respiratory effort, no wheezes, rales or rhonchi  GI: Soft, nontender, nondistended, normoactive bowel sounds  Musculoskeletal: Normal bulk and tone  Neuro: Non-focal, ambulating without difficulty, non-antalgic gait  Extremities: No lower extremity edema  Skin: Normal skin color, no rashes   Psychiatric: Normal mood and affect    Feliciano Vazquez MD  [facility]

## 2023-12-15 NOTE — PATIENT INSTRUCTIONS
-Consider Sodium Alginate for reflux raft   Wellness Visit for Adults   AMBULATORY CARE:   A wellness visit  is when you see your healthcare provider to get screened for health problems. Your healthcare provider will also give you advice on how to stay healthy. Write down your questions so you remember to ask them. Ask your healthcare provider how often you should have a wellness visit. What happens at a wellness visit:  Your healthcare provider will ask about your health, and your family history of health problems. This includes high blood pressure, heart disease, and cancer. He or she will ask if you have symptoms that concern you, if you smoke, and about your mood. You may also be asked about your intake of medicines, supplements, food, and alcohol. Any of the following may be done: Your weight  will be checked. Your height may also be checked so your body mass index (BMI) can be calculated. Your BMI shows if you are at a healthy weight. Your blood pressure  and heart rate will be checked. Your temperature may also be checked. Blood and urine tests  may be done. Blood tests may be done to check your cholesterol levels. Abnormal cholesterol levels increase your risk for heart disease and stroke. You may also need a blood or urine test to check for diabetes if you are at increased risk. Urine tests may be done to look for signs of an infection or kidney disease. A physical exam  includes checking your heartbeat and lungs with a stethoscope. Your healthcare provider may also check your skin to look for sun damage. Screening tests  may be recommended. A screening test is done to check for diseases that may not cause symptoms. The screening tests you may need depend on your age, gender, family history, and lifestyle habits. For example, colorectal screening may be recommended if you are 48years old or older.     Screening tests you need if you are a woman:   A Pap smear  is used to screen for cervical cancer. Pap smears are usually done every 3 to 5 years depending on your age. You may need them more often if you have had abnormal Pap smear test results in the past. Ask your healthcare provider how often you should have a Pap smear. A mammogram  is an x-ray of your breasts to screen for breast cancer. Experts recommend mammograms every 2 years starting at age 48 years. You may need a mammogram at age 52 years or younger if you have an increased risk for breast cancer. Talk to your healthcare provider about when you should start having mammograms and how often you need them. Vaccines you may need:   Get an influenza vaccine  every year. The influenza vaccine protects you from the flu. Several types of viruses cause the flu. The viruses change over time, so new vaccines are made each year. Get a tetanus-diphtheria (Td) booster vaccine  every 10 years. This vaccine protects you against tetanus and diphtheria. Tetanus is a severe infection that may cause painful muscle spasms and lockjaw. Diphtheria is a severe bacterial infection that causes a thick covering in the back of your mouth and throat. Get a human papillomavirus (HPV) vaccine  if you are female and aged 23 to 32 or male 23 to 24 and never received it. This vaccine protects you from HPV infection. HPV is the most common infection spread by sexual contact. HPV may also cause vaginal, penile, and anal cancers. Get a pneumococcal vaccine  if you are aged 72 years or older. The pneumococcal vaccine is an injection given to protect you from pneumococcal disease. Pneumococcal disease is an infection caused by pneumococcal bacteria. The infection may cause pneumonia, meningitis, or an ear infection. Get a shingles vaccine  if you are 60 or older, even if you have had shingles before. The shingles vaccine is an injection to protect you from the varicella-zoster virus. This is the same virus that causes chickenpox.  Shingles is a painful rash that develops in people who had chickenpox or have been exposed to the virus. How to eat healthy:  My Plate is a model for planning healthy meals. It shows the types and amounts of foods that should go on your plate. Fruits and vegetables make up about half of your plate, and grains and protein make up the other half. A serving of dairy is included on the side of your plate. The amount of calories and serving sizes you need depends on your age, gender, weight, and height. Examples of healthy foods are listed below:  Eat a variety of vegetables  such as dark green, red, and orange vegetables. You can also include canned vegetables low in sodium (salt) and frozen vegetables without added butter or sauces. Eat a variety of fresh fruits , canned fruit in 100% juice, frozen fruit, and dried fruit. Include whole grains. At least half of the grains you eat should be whole grains. Examples include whole-wheat bread, wheat pasta, brown rice, and whole-grain cereals such as oatmeal.    Eat a variety of protein foods such as seafood (fish and shellfish), lean meat, and poultry without skin (turkey and chicken). Examples of lean meats include pork leg, shoulder, or tenderloin, and beef round, sirloin, tenderloin, and extra lean ground beef. Other protein foods include eggs and egg substitutes, beans, peas, soy products, nuts, and seeds. Choose low-fat dairy products such as skim or 1% milk or low-fat yogurt, cheese, and cottage cheese. Limit unhealthy fats  such as butter, hard margarine, and shortening. Exercise:  Exercise at least 30 minutes per day on most days of the week. Some examples of exercise include walking, biking, dancing, and swimming. You can also fit in more physical activity by taking the stairs instead of the elevator or parking farther away from stores. Include muscle strengthening activities 2 days each week. Regular exercise provides many health benefits.  It helps you manage your weight, and decreases your risk for type 2 diabetes, heart disease, stroke, and high blood pressure. Exercise can also help improve your mood. Ask your healthcare provider about the best exercise plan for you. General health and safety guidelines:   Do not smoke. Nicotine and other chemicals in cigarettes and cigars can cause lung damage. Ask your healthcare provider for information if you currently smoke and need help to quit. E-cigarettes or smokeless tobacco still contain nicotine. Talk to your healthcare provider before you use these products. Limit alcohol. A drink of alcohol is 12 ounces of beer, 5 ounces of wine, or 1½ ounces of liquor. Lose weight, if needed. Being overweight increases your risk of certain health conditions. These include heart disease, high blood pressure, type 2 diabetes, and certain types of cancer. Protect your skin. Do not sunbathe or use tanning beds. Use sunscreen with a SPF 15 or higher. Apply sunscreen at least 15 minutes before you go outside. Reapply sunscreen every 2 hours. Wear protective clothing, hats, and sunglasses when you are outside. Drive safely. Always wear your seatbelt. Make sure everyone in your car wears a seatbelt. A seatbelt can save your life if you are in an accident. Do not use your cell phone when you are driving. This could distract you and cause an accident. Pull over if you need to make a call or send a text message. Practice safe sex. Use latex condoms if are sexually active and have more than one partner. Your healthcare provider may recommend screening tests for sexually transmitted infections (STIs). Wear helmets, lifejackets, and protective gear. Always wear a helmet when you ride a bike or motorcycle, go skiing, or play sports that could cause a head injury. Wear protective equipment when you play sports. Wear a lifejacket when you are on a boat or doing water sports.     © Copyright [employer] 2023 Information is for End User's use only and may not be sold, redistributed or otherwise used for commercial purposes. The above information is an  only. It is not intended as medical advice for individual conditions or treatments. Talk to your doctor, nurse or pharmacist before following any medical regimen to see if it is safe and effective for you.

## 2024-01-02 ENCOUNTER — LAB REQUISITION (OUTPATIENT)
Dept: LAB | Facility: HOSPITAL | Age: 69
End: 2024-01-02
Payer: COMMERCIAL

## 2024-01-02 ENCOUNTER — APPOINTMENT (OUTPATIENT)
Dept: LAB | Age: 69
End: 2024-01-02
Payer: COMMERCIAL

## 2024-01-02 DIAGNOSIS — R19.00 INTRA-ABDOMINAL AND PELVIC SWELLING, MASS AND LUMP, UNSPECIFIED SITE: ICD-10-CM

## 2024-01-02 DIAGNOSIS — R19.00 PELVIC MASS: ICD-10-CM

## 2024-01-02 LAB
ALBUMIN SERPL BCP-MCNC: 4.2 G/DL (ref 3.5–5)
ALP SERPL-CCNC: 44 U/L (ref 34–104)
ALT SERPL W P-5'-P-CCNC: 14 U/L (ref 7–52)
ANION GAP SERPL CALCULATED.3IONS-SCNC: 9 MMOL/L
AST SERPL W P-5'-P-CCNC: 19 U/L (ref 13–39)
BASOPHILS # BLD AUTO: 0.03 THOUSANDS/ÂΜL (ref 0–0.1)
BASOPHILS NFR BLD AUTO: 1 % (ref 0–1)
BILIRUB SERPL-MCNC: 0.68 MG/DL (ref 0.2–1)
BUN SERPL-MCNC: 15 MG/DL (ref 5–25)
CALCIUM SERPL-MCNC: 9.3 MG/DL (ref 8.4–10.2)
CANCER AG125 SERPL-ACNC: 22.4 U/ML (ref 0–35)
CHLORIDE SERPL-SCNC: 102 MMOL/L (ref 96–108)
CO2 SERPL-SCNC: 25 MMOL/L (ref 21–32)
CREAT SERPL-MCNC: 0.92 MG/DL (ref 0.6–1.3)
EOSINOPHIL # BLD AUTO: 0.12 THOUSAND/ÂΜL (ref 0–0.61)
EOSINOPHIL NFR BLD AUTO: 3 % (ref 0–6)
ERYTHROCYTE [DISTWIDTH] IN BLOOD BY AUTOMATED COUNT: 13.1 % (ref 11.6–15.1)
EST. AVERAGE GLUCOSE BLD GHB EST-MCNC: 134 MG/DL
GFR SERPL CREATININE-BSD FRML MDRD: 63 ML/MIN/1.73SQ M
GLUCOSE P FAST SERPL-MCNC: 92 MG/DL (ref 65–99)
HBA1C MFR BLD: 6.3 %
HCT VFR BLD AUTO: 39.9 % (ref 34.8–46.1)
HGB BLD-MCNC: 12.9 G/DL (ref 11.5–15.4)
IMM GRANULOCYTES # BLD AUTO: 0 THOUSAND/UL (ref 0–0.2)
IMM GRANULOCYTES NFR BLD AUTO: 0 % (ref 0–2)
LYMPHOCYTES # BLD AUTO: 1.31 THOUSANDS/ÂΜL (ref 0.6–4.47)
LYMPHOCYTES NFR BLD AUTO: 35 % (ref 14–44)
MCH RBC QN AUTO: 30.5 PG (ref 26.8–34.3)
MCHC RBC AUTO-ENTMCNC: 32.3 G/DL (ref 31.4–37.4)
MCV RBC AUTO: 94 FL (ref 82–98)
MONOCYTES # BLD AUTO: 0.28 THOUSAND/ÂΜL (ref 0.17–1.22)
MONOCYTES NFR BLD AUTO: 8 % (ref 4–12)
NEUTROPHILS # BLD AUTO: 1.97 THOUSANDS/ÂΜL (ref 1.85–7.62)
NEUTS SEG NFR BLD AUTO: 53 % (ref 43–75)
NRBC BLD AUTO-RTO: 0 /100 WBCS
PLATELET # BLD AUTO: 274 THOUSANDS/UL (ref 149–390)
PMV BLD AUTO: 10.2 FL (ref 8.9–12.7)
POTASSIUM SERPL-SCNC: 3.6 MMOL/L (ref 3.5–5.3)
PROT SERPL-MCNC: 7.1 G/DL (ref 6.4–8.4)
RBC # BLD AUTO: 4.23 MILLION/UL (ref 3.81–5.12)
SODIUM SERPL-SCNC: 136 MMOL/L (ref 135–147)
TSH SERPL DL<=0.05 MIU/L-ACNC: 0.67 UIU/ML (ref 0.45–4.5)
WBC # BLD AUTO: 3.71 THOUSAND/UL (ref 4.31–10.16)

## 2024-01-02 PROCEDURE — 36415 COLL VENOUS BLD VENIPUNCTURE: CPT

## 2024-01-02 PROCEDURE — 84443 ASSAY THYROID STIM HORMONE: CPT

## 2024-01-02 PROCEDURE — 86901 BLOOD TYPING SEROLOGIC RH(D): CPT | Performed by: OBSTETRICS & GYNECOLOGY

## 2024-01-02 PROCEDURE — 80053 COMPREHEN METABOLIC PANEL: CPT

## 2024-01-02 PROCEDURE — 86850 RBC ANTIBODY SCREEN: CPT | Performed by: OBSTETRICS & GYNECOLOGY

## 2024-01-02 PROCEDURE — 85025 COMPLETE CBC W/AUTO DIFF WBC: CPT

## 2024-01-02 PROCEDURE — 86900 BLOOD TYPING SEROLOGIC ABO: CPT | Performed by: OBSTETRICS & GYNECOLOGY

## 2024-01-02 PROCEDURE — 86304 IMMUNOASSAY TUMOR CA 125: CPT

## 2024-01-02 PROCEDURE — 83036 HEMOGLOBIN GLYCOSYLATED A1C: CPT

## 2024-01-03 ENCOUNTER — OFFICE VISIT (OUTPATIENT)
Dept: INTERNAL MEDICINE CLINIC | Facility: CLINIC | Age: 69
End: 2024-01-03
Payer: COMMERCIAL

## 2024-01-03 ENCOUNTER — ANESTHESIA EVENT (OUTPATIENT)
Dept: PERIOP | Facility: HOSPITAL | Age: 69
End: 2024-01-03
Payer: COMMERCIAL

## 2024-01-03 VITALS
BODY MASS INDEX: 24.79 KG/M2 | HEIGHT: 64 IN | OXYGEN SATURATION: 98 % | WEIGHT: 145.2 LBS | SYSTOLIC BLOOD PRESSURE: 122 MMHG | HEART RATE: 96 BPM | DIASTOLIC BLOOD PRESSURE: 70 MMHG

## 2024-01-03 DIAGNOSIS — E06.3 HYPOTHYROIDISM DUE TO HASHIMOTO'S THYROIDITIS: ICD-10-CM

## 2024-01-03 DIAGNOSIS — K21.9 GASTROESOPHAGEAL REFLUX DISEASE WITHOUT ESOPHAGITIS: ICD-10-CM

## 2024-01-03 DIAGNOSIS — N94.9 ADNEXAL CYST: Primary | ICD-10-CM

## 2024-01-03 DIAGNOSIS — R73.03 PREDIABETES: ICD-10-CM

## 2024-01-03 DIAGNOSIS — M06.09 SERONEGATIVE ARTHROPATHY OF MULTIPLE SITES (HCC): ICD-10-CM

## 2024-01-03 DIAGNOSIS — E03.8 HYPOTHYROIDISM DUE TO HASHIMOTO'S THYROIDITIS: ICD-10-CM

## 2024-01-03 DIAGNOSIS — I10 PRIMARY HYPERTENSION: ICD-10-CM

## 2024-01-03 LAB
ABO GROUP BLD: NORMAL
BLD GP AB SCN SERPL QL: NEGATIVE
RH BLD: POSITIVE
SPECIMEN EXPIRATION DATE: NORMAL

## 2024-01-03 PROCEDURE — 99214 OFFICE O/P EST MOD 30 MIN: CPT | Performed by: INTERNAL MEDICINE

## 2024-01-03 RX ORDER — ECHINACEA PURPUREA EXTRACT 125 MG
1 TABLET ORAL AS NEEDED
COMMUNITY

## 2024-01-03 RX ORDER — SACCHAROMYCES BOULARDII 250 MG
250 CAPSULE ORAL DAILY
COMMUNITY

## 2024-01-03 RX ORDER — SIMETHICONE 125 MG
125 TABLET,CHEWABLE ORAL EVERY 6 HOURS PRN
COMMUNITY

## 2024-01-03 NOTE — ASSESSMENT & PLAN NOTE
-Discussed normal TSH range for age.  -She is concerned and how low her TSH is.  Recommended reducing her over-the-counter thyroid supplement medication to 1 capsule daily while continuing 62.5 mcg of her levothyroxine.  Plan to repeat TSH in 6 weeks.

## 2024-01-03 NOTE — PATIENT INSTRUCTIONS
-Reduce over-the-counter thyroid supplement to 1 capsule daily   -Follow-up Hgb A1c in 6 months  -Check TSH in 6 weeks    -Restart Prilosec

## 2024-01-03 NOTE — ASSESSMENT & PLAN NOTE
-Planning to hold DGL and sodium alginate supplements for now  -Restart Prilosec 20 mg daily  -She remarry resume DGL and sodium alginate post surgery

## 2024-01-03 NOTE — PROGRESS NOTES
Name: Miles Fall      : 1955      MRN: [id number]  Encounter Provider: Feliciano Almaguer MD  Encounter Date: 1/3/2024   [facility]    Assessment & Plan     1. Adnexal cyst  Assessment & Plan:  -Measuring 8.3 x 3.4 cm  -Scheduled for salpingo-oophorectomy with Gyn/Onc on         2. Hypothyroidism due to Hashimoto's thyroiditis  Assessment & Plan:  -Discussed normal TSH range for age.  -She is concerned and how low her TSH is.  Recommended reducing her over-the-counter thyroid supplement medication to 1 capsule daily while continuing 62.5 mcg of her levothyroxine.  Plan to repeat TSH in 6 weeks.    Orders:  -     TSH, 3rd generation with Free T4 reflex; Future; Expected date: 2024    3. Primary hypertension  Assessment & Plan:  -Blood pressure controlled off of antihypertensives  -Continue to monitor      4. Seronegative arthropathy of multiple sites (HCC)  Assessment & Plan:  -Stable  -Appreciate rheumatology follow-up and recommendations      5. Prediabetes  Assessment & Plan:  -Last A1c 6.3  -Repeat in 6 months    Orders:  -     Hemoglobin A1C; Future; Expected date: 2024    6. Gastroesophageal reflux disease without esophagitis  Assessment & Plan:  -Planning to hold DGL and sodium alginate supplements for now  -Restart Prilosec 20 mg daily  -She remarry resume DGL and sodium alginate post surgery           Subjective      HPI  Patient presents today to discuss her vitamins and supplements prior to undergoing surgery for her ovarian cyst.  Her surgery is scheduled on .  Recent imaging revealed a complex right adnexal cyst measuring 8.3 x 3.4 cm.    The patient states she was told she would need to hold her supplements including her DGL and sodium alginate.  She is wondering if she should go back to taking her Prilosec for now and then switch back over after the procedure.    She also has concerns regarding her TSH level.  Her most  recent reading was 0.671.  She reports she is taking an over-the-counter thyroid supplement containing iodine and bovine extract.  She currently takes 2 capsules daily in addition to 62.5 mcg of levothyroxine.      All other systems negative except for pertinent findings noted in HPI.       Current Outpatient Medications on File Prior to Visit   Medication Sig   • B Complex Vitamins (B COMPLEX PO) Take by mouth   • Boswellia-Glucosamine-Vit D (OSTEO BI-FLEX ONE PER DAY PO) Take by mouth EASE   • CALCIUM CITRATE PO Take by mouth   • Cholecalciferol (Vitamin D-3) 125 MCG (5000 UT) TABS Take by mouth 6 days a week   • levothyroxine 125 mcg tablet TAKE 1/2 TABLET (62.5 MCG) BY MOUTH ONCE DAILY.   • Magnesium 250 MG TABS Take by mouth   • MILK THISTLE PO Take by mouth   • NON FORMULARY Take by mouth [facility] [facility] multi-element Powder form   • NON FORMULARY 2 (two) times a day as needed Slippery elm bark   • NON FORMULARY 2 (two) times a day Stomach and intestinal health DGL   • NON FORMULARY in the morning Acid soothe   • NON FORMULARY 3 (three) times a day Reflux gourmet- paste   • NON FORMULARY 2 (two) times a day Thytrophin PMG   • NON FORMULARY in the morning MK-7   • NON FORMULARY Cold and sore throat relief   • NON FORMULARY Nerve tonic   • NON FORMULARY Place 1 Application on the skin Patient using Progest cream   • Omega-3 Fatty Acids (fish oil) 1,000 mg Take 1 capsule by mouth daily   • Polyvinyl Alcohol-Povidone (REFRESH OP) Apply to eye if needed   • simethicone (MYLICON) 125 MG chewable tablet Chew 125 mg every 6 (six) hours as needed for flatulence   • sodium chloride (OCEAN) 0.65 % nasal spray 1 spray into each nostril as needed for congestion   • sucralfate (CARAFATE) 1 g tablet Take 1 tablet (1 g total) by mouth 4 (four) times a day as needed (Take if you experience chest pain) for up to 5 days   • Turmeric Curcumin 500 MG CAPS Take by mouth   • VITAMIN A PO Take by mouth Once a week   • Zinc 20 MG CAPS  "Take 40 mg by mouth   • Ascorbic Acid (Vitamin C) 500 MG CAPS Take by mouth (Patient not taking: Reported on 1/3/2024)   • omeprazole (PriLOSEC) 20 mg delayed release capsule Take 1 capsule (20 mg total) by mouth daily (Patient not taking: Reported on 1/3/2024)   • saccharomyces boulardii (FLORASTOR) 250 mg capsule Take 250 mg by mouth in the morning (Patient not taking: Reported on 1/3/2024)   • [DISCONTINUED] progesterone (ENDOMETRIN) 100 MG vaginal insert Insert 100 mg into the vagina 2 (two) times a day 3 weeks out of 4 once daily (Patient not taking: Reported on 1/3/2024)       Objective     /70   Pulse 96   Ht 5' 4\" (1.626 m)   Wt 65.9 kg (145 lb 3.2 oz)   SpO2 98%   BMI 24.92 kg/m²     BP Readings from Last 3 Encounters:   01/03/24 122/70   12/15/23 126/82   12/02/23 123/80        Wt Readings from Last 3 Encounters:   01/03/24 65.9 kg (145 lb 3.2 oz)   12/15/23 65.8 kg (145 lb)   11/27/23 68.5 kg (151 lb)       Physical Exam    General: NAD  HEENT: NCAT, EOMI, normal conjunctiva  Cardiovascular: RRR, normal S1 and S2, no m/r/g  Pulmonary: Normal respiratory effort, no wheezes, rales or rhonchi  Extremities: No lower extremity edema  Skin: Normal skin color, no rashes     Feliciano Almaguer MD  "

## 2024-01-03 NOTE — PRE-PROCEDURE INSTRUCTIONS
Pre-Surgery Instructions:   Medication Instructions    B Complex Vitamins (B COMPLEX PO) Stop taking 7 days prior to surgery.    Boswellia-Glucosamine-Vit D (OSTEO BI-FLEX ONE PER DAY PO) Stop taking 7 days prior to surgery.    CALCIUM CITRATE PO Stop taking 7 days prior to surgery.    Cholecalciferol (Vitamin D-3) 125 MCG (5000 UT) TABS Stop taking 7 days prior to surgery.    levothyroxine 125 mcg tablet Take day of surgery.    Magnesium 250 MG TABS Stop taking 7 days prior to surgery.    MILK THISTLE PO Stop taking 7 days prior to surgery.    NON FORMULARY Stop taking 7 days prior to surgery.    NON FORMULARY Stop taking 7 days prior to surgery.    NON FORMULARY Stop taking 7 days prior to surgery.    NON FORMULARY Stop taking 7 days prior to surgery.    NON FORMULARY Stop taking 7 days prior to surgery.    NON FORMULARY Stop taking 7 days prior to surgery.    NON FORMULARY Stop taking 7 days prior to surgery.    NON FORMULARY Stop taking 7 days prior to surgery.    NON FORMULARY Stop taking 7 days prior to surgery.    Omega-3 Fatty Acids (fish oil) 1,000 mg Stop taking 7 days prior to surgery.    Polyvinyl Alcohol-Povidone (REFRESH OP) Uses PRN- OK to take day of surgery    progesterone (ENDOMETRIN) 100 MG vaginal insert Hold day of surgery.    saccharomyces boulardii (FLORASTOR) 250 mg capsule Stop taking 7 days prior to surgery.    simethicone (MYLICON) 125 MG chewable tablet Take night before surgery    sodium chloride (OCEAN) 0.65 % nasal spray Uses PRN- OK to take day of surgery    sucralfate (CARAFATE) 1 g tablet Take night before surgery    Turmeric Curcumin 500 MG CAPS Stop taking 7 days prior to surgery.    VITAMIN A PO Stop taking 7 days prior to surgery.    Zinc 20 MG CAPS Stop taking 7 days prior to surgery.   Pre- surgical drinks given. Written/verbal instructions given by surgeon's office. All questions/concerns addressed. Pt advised to contact surgeon's office with any additional  questions/concerns.    Medication instructions for day surgery reviewed. Please use only a sip of water to take your instructed medications. Avoid all over the counter vitamins, supplements and NSAIDS for one week prior to surgery per anesthesia guidelines. Tylenol is ok to take as needed.     You will receive a call one business day prior to surgery with an arrival time and hospital directions. If your surgery is scheduled on a Monday, the hospital will be calling you on the Friday prior to your surgery. If you have not heard from anyone by 8pm, please call the hospital supervisor through the hospital  at [phone]. ([facility] [phone]).    Do not eat or drink anything after midnight the night before your surgery, including candy, mints, lifesavers, or chewing gum. Do not drink alcohol 24hrs before your surgery. Try not to smoke at least 24hrs before your surgery.       Follow the pre surgery showering instructions as listed in the “My Surgical Experience Booklet” or otherwise provided by your surgeon's office. Do not use a blade to shave the surgical area 1 week before surgery. It is okay to use a clean electric clippers up to 24 hours before surgery. Do not apply any lotions, creams, including makeup, cologne, deodorant, or perfumes after showering on the day of your surgery. Do not use dry shampoo, hair spray, hair gel, or any type of hair products.     No contact lenses, eye make-up, or artificial eyelashes. Remove nail polish, including gel polish, and any artificial, gel, or acrylic nails if possible. Remove all jewelry including rings and body piercing jewelry.     Wear causal clothing that is easy to take on and off. Consider your type of surgery.    Keep any valuables, jewelry, piercings at home. Please bring any specially ordered equipment (sling, braces) if indicated.    Arrange for a responsible person to drive you to and from the hospital on the day of your surgery. Visitor Guidelines  discussed.     Call the surgeon's office with any new illnesses, exposures, or additional questions prior to surgery.    Please reference your “My Surgical Experience Booklet” for additional information to prepare for your upcoming surgery.

## 2024-01-05 ENCOUNTER — TELEPHONE (OUTPATIENT)
Dept: HEMATOLOGY ONCOLOGY | Facility: CLINIC | Age: 69
End: 2024-01-05

## 2024-01-05 NOTE — TELEPHONE ENCOUNTER
Patient Call    Who are you speaking with? Patient    If it is not the patient, are they listed on an active communication consent form? N/A   What is the reason for this call? Patient would like to inform 'Dr. Ha she's having a lot of gas and she would like to speak with him.  She is scheduled for surgery 1/12/24    Does this require a call back? Yes   If a call back is required, please list [facility] call back number [phone]   If a call back is required, advise that a message will be forwarded to their care team and someone will return their call as soon as possible.   Did you relay this information to the patient? Yes

## 2024-01-07 PROBLEM — M47.816 LUMBAR SPONDYLOSIS: Status: ACTIVE | Noted: 2024-01-07

## 2024-01-09 ENCOUNTER — TELEPHONE (OUTPATIENT)
Dept: GYNECOLOGIC ONCOLOGY | Facility: CLINIC | Age: 69
End: 2024-01-09

## 2024-01-09 ENCOUNTER — TELEPHONE (OUTPATIENT)
Dept: HEMATOLOGY ONCOLOGY | Facility: CLINIC | Age: 69
End: 2024-01-09

## 2024-01-09 NOTE — TELEPHONE ENCOUNTER
Patient Call    Who are you speaking with? Patient    If it is not the patient, are they listed on an active communication consent form? N/A   What is the reason for this call? The patient would like to speak to the team about the surgery.   Does this require a call back? Yes   If a call back is required, please list [facility] call back number [phone]   If a call back is required, advise that a message will be forwarded to their care team and someone will return their call as soon as possible.   Did you relay this information to the patient? Yes

## 2024-01-12 ENCOUNTER — ANESTHESIA (OUTPATIENT)
Dept: PERIOP | Facility: HOSPITAL | Age: 69
End: 2024-01-12
Payer: COMMERCIAL

## 2024-01-12 ENCOUNTER — HOSPITAL ENCOUNTER (OUTPATIENT)
Facility: HOSPITAL | Age: 69
Setting detail: OUTPATIENT SURGERY
Discharge: HOME | End: 2024-01-12
Attending: OBSTETRICS & GYNECOLOGY | Admitting: OBSTETRICS & GYNECOLOGY
Payer: COMMERCIAL

## 2024-01-12 ENCOUNTER — TELEPHONE (OUTPATIENT)
Dept: HEMATOLOGY ONCOLOGY | Facility: CLINIC | Age: 69
End: 2024-01-12

## 2024-01-12 VITALS
OXYGEN SATURATION: 96 % | WEIGHT: 140.65 LBS | DIASTOLIC BLOOD PRESSURE: 70 MMHG | SYSTOLIC BLOOD PRESSURE: 144 MMHG | TEMPERATURE: 97.9 F | HEART RATE: 60 BPM | BODY MASS INDEX: 24.14 KG/M2 | RESPIRATION RATE: 16 BRPM

## 2024-01-12 DIAGNOSIS — R19.00 PELVIC MASS: ICD-10-CM

## 2024-01-12 LAB
ABO GROUP BLD: NORMAL
RH BLD: POSITIVE

## 2024-01-12 PROCEDURE — NC001 PR NO CHARGE: Performed by: PHYSICIAN ASSISTANT

## 2024-01-12 PROCEDURE — NC001 PR NO CHARGE: Performed by: OBSTETRICS & GYNECOLOGY

## 2024-01-12 PROCEDURE — 58661 LAPAROSCOPY REMOVE ADNEXA: CPT | Performed by: OBSTETRICS & GYNECOLOGY

## 2024-01-12 PROCEDURE — 88307 TISSUE EXAM BY PATHOLOGIST: CPT | Performed by: PATHOLOGY

## 2024-01-12 PROCEDURE — S2900 ROBOTIC SURGICAL SYSTEM: HCPCS | Performed by: OBSTETRICS & GYNECOLOGY

## 2024-01-12 PROCEDURE — 88331 PATH CONSLTJ SURG 1 BLK 1SPC: CPT | Performed by: OBSTETRICS & GYNECOLOGY

## 2024-01-12 PROCEDURE — 88331 PATH CONSLTJ SURG 1 BLK 1SPC: CPT | Performed by: PATHOLOGY

## 2024-01-12 PROCEDURE — C1765 ADHESION BARRIER: HCPCS | Performed by: OBSTETRICS & GYNECOLOGY

## 2024-01-12 RX ORDER — FENTANYL CITRATE/PF 50 MCG/ML
25 SYRINGE (ML) INJECTION
Status: DISCONTINUED | OUTPATIENT
Start: 2024-01-12 | End: 2024-01-12 | Stop reason: HOSPADM

## 2024-01-12 RX ORDER — VECURONIUM BROMIDE 1 MG/ML
INJECTION, POWDER, LYOPHILIZED, FOR SOLUTION INTRAVENOUS AS NEEDED
Status: DISCONTINUED | OUTPATIENT
Start: 2024-01-12 | End: 2024-01-12

## 2024-01-12 RX ORDER — DEXAMETHASONE SODIUM PHOSPHATE 10 MG/ML
INJECTION, SOLUTION INTRAMUSCULAR; INTRAVENOUS AS NEEDED
Status: DISCONTINUED | OUTPATIENT
Start: 2024-01-12 | End: 2024-01-12

## 2024-01-12 RX ORDER — SODIUM CHLORIDE 9 MG/ML
INJECTION, SOLUTION INTRAVENOUS CONTINUOUS PRN
Status: DISCONTINUED | OUTPATIENT
Start: 2024-01-12 | End: 2024-01-12

## 2024-01-12 RX ORDER — PROPOFOL 10 MG/ML
INJECTION, EMULSION INTRAVENOUS CONTINUOUS PRN
Status: DISCONTINUED | OUTPATIENT
Start: 2024-01-12 | End: 2024-01-12

## 2024-01-12 RX ORDER — MIDAZOLAM HYDROCHLORIDE 2 MG/2ML
INJECTION, SOLUTION INTRAMUSCULAR; INTRAVENOUS AS NEEDED
Status: DISCONTINUED | OUTPATIENT
Start: 2024-01-12 | End: 2024-01-12

## 2024-01-12 RX ORDER — LIDOCAINE HCL/PF 100 MG/5ML
SYRINGE (ML) INJECTION AS NEEDED
Status: DISCONTINUED | OUTPATIENT
Start: 2024-01-12 | End: 2024-01-12

## 2024-01-12 RX ORDER — ONDANSETRON 2 MG/ML
4 INJECTION INTRAMUSCULAR; INTRAVENOUS EVERY 6 HOURS PRN
Status: DISCONTINUED | OUTPATIENT
Start: 2024-01-12 | End: 2024-01-12 | Stop reason: HOSPADM

## 2024-01-12 RX ORDER — SODIUM CHLORIDE, SODIUM LACTATE, POTASSIUM CHLORIDE, CALCIUM CHLORIDE 600; 310; 30; 20 MG/100ML; MG/100ML; MG/100ML; MG/100ML
INJECTION, SOLUTION INTRAVENOUS CONTINUOUS PRN
Status: DISCONTINUED | OUTPATIENT
Start: 2024-01-12 | End: 2024-01-12

## 2024-01-12 RX ORDER — ONDANSETRON 2 MG/ML
INJECTION INTRAMUSCULAR; INTRAVENOUS AS NEEDED
Status: DISCONTINUED | OUTPATIENT
Start: 2024-01-12 | End: 2024-01-12

## 2024-01-12 RX ORDER — HEPARIN SODIUM 5000 [USP'U]/ML
5000 INJECTION, SOLUTION INTRAVENOUS; SUBCUTANEOUS
Status: COMPLETED | OUTPATIENT
Start: 2024-01-12 | End: 2024-01-12

## 2024-01-12 RX ORDER — BUPIVACAINE HYDROCHLORIDE 2.5 MG/ML
INJECTION, SOLUTION EPIDURAL; INFILTRATION; INTRACAUDAL AS NEEDED
Status: DISCONTINUED | OUTPATIENT
Start: 2024-01-12 | End: 2024-01-12 | Stop reason: HOSPADM

## 2024-01-12 RX ORDER — CEFAZOLIN SODIUM 1 G/50ML
1000 SOLUTION INTRAVENOUS ONCE
Status: COMPLETED | OUTPATIENT
Start: 2024-01-12 | End: 2024-01-12

## 2024-01-12 RX ORDER — OXYCODONE HYDROCHLORIDE 5 MG/1
5 TABLET ORAL EVERY 6 HOURS PRN
Qty: 15 TABLET | Refills: 0 | Status: SHIPPED | OUTPATIENT
Start: 2024-01-12 | End: 2024-01-22

## 2024-01-12 RX ORDER — HYDROMORPHONE HCL/PF 1 MG/ML
0.5 SYRINGE (ML) INJECTION
Status: DISCONTINUED | OUTPATIENT
Start: 2024-01-12 | End: 2024-01-12 | Stop reason: HOSPADM

## 2024-01-12 RX ORDER — FENTANYL CITRATE 50 UG/ML
INJECTION, SOLUTION INTRAMUSCULAR; INTRAVENOUS AS NEEDED
Status: DISCONTINUED | OUTPATIENT
Start: 2024-01-12 | End: 2024-01-12

## 2024-01-12 RX ORDER — PROPOFOL 10 MG/ML
INJECTION, EMULSION INTRAVENOUS AS NEEDED
Status: DISCONTINUED | OUTPATIENT
Start: 2024-01-12 | End: 2024-01-12

## 2024-01-12 RX ORDER — MAGNESIUM HYDROXIDE 1200 MG/15ML
LIQUID ORAL AS NEEDED
Status: DISCONTINUED | OUTPATIENT
Start: 2024-01-12 | End: 2024-01-12 | Stop reason: HOSPADM

## 2024-01-12 RX ORDER — SODIUM CHLORIDE 9 MG/ML
125 INJECTION, SOLUTION INTRAVENOUS CONTINUOUS
Status: DISCONTINUED | OUTPATIENT
Start: 2024-01-12 | End: 2024-01-12

## 2024-01-12 RX ORDER — ACETAMINOPHEN 325 MG/1
975 TABLET ORAL ONCE
Status: COMPLETED | OUTPATIENT
Start: 2024-01-12 | End: 2024-01-12

## 2024-01-12 RX ORDER — ACETAMINOPHEN 325 MG/1
975 TABLET ORAL EVERY 6 HOURS PRN
Status: DISCONTINUED | OUTPATIENT
Start: 2024-01-12 | End: 2024-01-12 | Stop reason: HOSPADM

## 2024-01-12 RX ORDER — EPHEDRINE SULFATE 50 MG/ML
INJECTION INTRAVENOUS AS NEEDED
Status: DISCONTINUED | OUTPATIENT
Start: 2024-01-12 | End: 2024-01-12

## 2024-01-12 RX ORDER — ONDANSETRON 2 MG/ML
4 INJECTION INTRAMUSCULAR; INTRAVENOUS ONCE AS NEEDED
Status: DISCONTINUED | OUTPATIENT
Start: 2024-01-12 | End: 2024-01-12 | Stop reason: HOSPADM

## 2024-01-12 RX ORDER — IBUPROFEN 600 MG/1
600 TABLET ORAL EVERY 6 HOURS PRN
Status: DISCONTINUED | OUTPATIENT
Start: 2024-01-12 | End: 2024-01-12 | Stop reason: HOSPADM

## 2024-01-12 RX ADMIN — PROPOFOL 120 MCG/KG/MIN: 10 INJECTION, EMULSION INTRAVENOUS at 07:42

## 2024-01-12 RX ADMIN — ONDANSETRON 4 MG: 2 INJECTION INTRAMUSCULAR; INTRAVENOUS at 09:18

## 2024-01-12 RX ADMIN — EPHEDRINE SULFATE 7.5 MG: 50 INJECTION, SOLUTION INTRAVENOUS at 07:44

## 2024-01-12 RX ADMIN — FENTANYL CITRATE 50 MCG: 50 INJECTION INTRAMUSCULAR; INTRAVENOUS at 09:35

## 2024-01-12 RX ADMIN — DEXAMETHASONE SODIUM PHOSPHATE 5 MG: 10 INJECTION INTRAMUSCULAR; INTRAVENOUS at 07:54

## 2024-01-12 RX ADMIN — VECURONIUM BROMIDE 1 MG: 1 INJECTION, POWDER, LYOPHILIZED, FOR SOLUTION INTRAVENOUS at 08:38

## 2024-01-12 RX ADMIN — SODIUM CHLORIDE, SODIUM LACTATE, POTASSIUM CHLORIDE, AND CALCIUM CHLORIDE: .6; .31; .03; .02 INJECTION, SOLUTION INTRAVENOUS at 09:27

## 2024-01-12 RX ADMIN — VECURONIUM BROMIDE 6 MG: 1 INJECTION, POWDER, LYOPHILIZED, FOR SOLUTION INTRAVENOUS at 07:42

## 2024-01-12 RX ADMIN — ACETAMINOPHEN 325MG 975 MG: 325 TABLET ORAL at 05:43

## 2024-01-12 RX ADMIN — SODIUM CHLORIDE 125 ML/HR: 0.9 INJECTION, SOLUTION INTRAVENOUS at 06:10

## 2024-01-12 RX ADMIN — FENTANYL CITRATE 50 MCG: 50 INJECTION INTRAMUSCULAR; INTRAVENOUS at 08:21

## 2024-01-12 RX ADMIN — PROPOFOL 150 MG: 10 INJECTION, EMULSION INTRAVENOUS at 07:41

## 2024-01-12 RX ADMIN — FENTANYL CITRATE 50 MCG: 50 INJECTION INTRAMUSCULAR; INTRAVENOUS at 08:03

## 2024-01-12 RX ADMIN — SUGAMMADEX 150 MG: 100 INJECTION, SOLUTION INTRAVENOUS at 09:23

## 2024-01-12 RX ADMIN — CEFAZOLIN SODIUM 1000 MG: 1 SOLUTION INTRAVENOUS at 07:54

## 2024-01-12 RX ADMIN — MIDAZOLAM 2 MG: 1 INJECTION INTRAMUSCULAR; INTRAVENOUS at 07:30

## 2024-01-12 RX ADMIN — HEPARIN SODIUM 5000 UNITS: 5000 INJECTION INTRAVENOUS; SUBCUTANEOUS at 05:53

## 2024-01-12 RX ADMIN — FENTANYL CITRATE 50 MCG: 50 INJECTION INTRAMUSCULAR; INTRAVENOUS at 07:41

## 2024-01-12 RX ADMIN — SODIUM CHLORIDE: 9 INJECTION, SOLUTION INTRAVENOUS at 07:51

## 2024-01-12 RX ADMIN — LIDOCAINE HYDROCHLORIDE 80 MG: 20 INJECTION INTRAVENOUS at 07:41

## 2024-01-12 RX ADMIN — ACETAMINOPHEN 325MG 975 MG: 325 TABLET ORAL at 11:19

## 2024-01-12 NOTE — ANESTHESIA POSTPROCEDURE EVALUATION
Post-Op Assessment Note    CV Status:  Stable    Pain management: adequate       Mental Status:  Alert and awake   Hydration Status:  Euvolemic   PONV Controlled:  Controlled   Airway Patency:  Patent     Post Op Vitals Reviewed: Yes      Staff: Anesthesiologist               /75 (01/12/24 1010)    Temp 98 °F (36.7 °C) (01/12/24 1010)    Pulse 64 (01/12/24 1010)   Resp 17 (01/12/24 1010)    SpO2 95 % (01/12/24 1010)

## 2024-01-12 NOTE — ANESTHESIA PREPROCEDURE EVALUATION
Procedure:  (LTH) W/ ROBOT, BILATERAL SALPINGO-OOPHORECTOMY (Abdomen)  LAPAROTOMY EXPLORATORY W/ ROBOT (Abdomen)    Relevant Problems   CARDIO   (+) Hyperlipidemia   (+) Primary hypertension      ENDO   (+) Hypothyroidism due to Hashimoto's thyroiditis      GI/HEPATIC   (+) Gastroesophageal reflux disease without esophagitis      MUSCULOSKELETAL   (+) Cervical spondylosis without myelopathy   (+) Lumbar spondylosis   (+) Primary generalized (osteo)arthritis   (+) Primary osteoarthritis of both first carpometacarpal joints        Physical Exam    Airway    Mallampati score: II         Dental       Cardiovascular  Rhythm: regular    Pulmonary   Breath sounds clear to auscultation    Other Findings  post-pubertal.      Anesthesia Plan  ASA Score- 2     Anesthesia Type- general with ASA Monitors.         Additional Monitors:     Airway Plan:            Plan Factors-Exercise tolerance (METS): >4 METS.    Chart reviewed.   Existing labs reviewed. Patient summary reviewed.    Patient is not a current smoker. Patient not instructed to abstain from smoking on day of procedure. Patient did not smoke on day of surgery.    There is medical exclusion for perioperative obstructive sleep apnea risk education.        Induction- intravenous.    Postoperative Plan-     Informed Consent- Anesthetic plan and risks discussed with patient.

## 2024-01-12 NOTE — H&P
Assessment/Plan:    68-year-old with an 8.2 x 4.9 cm right ovarian cyst.  Her performance status is 0.  1.  Plan for robotic assisted total laparoscopic bilateral salpingo-oophorectomy, intraoperative frozen section analysis with possible hysterectomy, staging including lymphadenectomy, peritoneal biopsies, omentectomy.  She decided that she does not desire elective hysterectomy.        CHIEF COMPLAINT: Here for surgery            Patient ID: Miles Fall is a 68 y.o. female  Who presents for surgery.  After reflection on the operative procedures, she has decided she would prefer not to have a hysterectomy unless it is necessary based on ovarian pathology.  No other interval change in medications or medical history since her last visit the office.  She has no complaints today.        Review of Systems   Constitutional:  Negative for activity change and unexpected weight change.   HENT: Negative.     Eyes: Negative.    Respiratory: Negative.     Cardiovascular: Negative.    Gastrointestinal:  Negative for abdominal distention and abdominal pain.   Endocrine: Negative.    Genitourinary:  Negative for pelvic pain and vaginal bleeding.   Musculoskeletal: Negative.    Skin: Negative.    Allergic/Immunologic: Negative.    Neurological: Negative.    Hematological: Negative.    Psychiatric/Behavioral: Negative.         Current Facility-Administered Medications   Medication Dose Route Frequency Provider Last Rate Last Admin    ceFAZolin (ANCEF) IVPB (premix in dextrose) 1,000 mg 50 mL  1,000 mg Intravenous Once Marty Ha MD        sodium chloride 0.9 % infusion  125 mL/hr Intravenous Continuous Luis Enrique Tomlinson  mL/hr at 01/12/24 0610 125 mL/hr at 01/12/24 0610       Allergies   Allergen Reactions    Ciprofloxacin Other (See Comments)     Nightmares    Citrus - Food Allergy GI Intolerance    Dust Mite Extract Other (See Comments)    Gluten Meal - Food Allergy Other (See Comments)     Autoimmune issue      Milk-Related Compounds - Food Allergy Allergic Rhinitis     Dairy products    Molds & Smuts Other (See Comments)    Mushroom Extract Complex - Food Allergy Other (See Comments)    Shrimp Extract Allergy Skin Test - Food Allergy Other (See Comments)    Wheat Bran - Food Allergy Other (See Comments)       Past Medical History:   Diagnosis Date    Anesthesia complication     very sore throat after surgery in [facility]    Anxiety and depression     Dry eyes     Environmental and seasonal allergies     Fall     7/2022    GERD (gastroesophageal reflux disease)     Hyperlipidemia     Hypertension     Hypothyroidism     Hashimoto's    Lower extremity weakness     Osteoarthritis     Osteopenia     Psoriasis     Scalp    Sjogren's syndrome without extraglandular involvement (HCC)     Urinary, incontinence, stress female     UTI (urinary tract infection)        Past Surgical History:   Procedure Laterality Date    ADENOIDECTOMY      APPENDECTOMY N/A     BREAST MASS EXCISION Left     Benign    EGD AND COLONOSCOPY      12/2023    INGUINAL HERNIA REPAIR Left     TUBAL LIGATION         OB History    No obstetric history on file.         Family History   Problem Relation Age of Onset    Cervical cancer Mother     Hypothyroidism Mother     Migraines Mother     Heart disease Mother     Gallbladder disease Mother     Peripheral vascular disease Mother     Arthritis Mother     Osteoporosis Mother     Stroke Father     Hypertension Father     Depression Sister     Cancer Sister         Bladder    Hypertension Brother     Hyperlipidemia Brother     Coronary artery disease Brother     Psoriasis Brother     Hypertension Brother     Hyperlipidemia Brother        The following portions of the patient's history were reviewed and updated as appropriate: allergies, current medications, past family history, past medical history, past social history, past surgical history, and problem list.      Objective:    Blood pressure 142/88, pulse  81, temperature 98.5 °F (36.9 °C), temperature source Temporal, resp. rate 18, weight 63.8 kg (140 lb 10.5 oz), SpO2 98%.  Body mass index is 24.14 kg/m².    Physical Exam  Vitals reviewed.   Constitutional:       General: She is not in acute distress.     Appearance: Normal appearance. She is not ill-appearing.   HENT:      Head: Normocephalic and atraumatic.      Mouth/Throat:      Mouth: Mucous membranes are moist.   Eyes:      General: No scleral icterus.        Right eye: No discharge.         Left eye: No discharge.      Conjunctiva/sclera: Conjunctivae normal.   Cardiovascular:      Rate and Rhythm: Normal rate and regular rhythm.      Heart sounds: Normal heart sounds.   Pulmonary:      Effort: Pulmonary effort is normal.      Breath sounds: Normal breath sounds.   Abdominal:      Palpations: Abdomen is soft.   Musculoskeletal:      Right lower leg: No edema.      Left lower leg: No edema.   Skin:     General: Skin is warm and dry.      Coloration: Skin is not jaundiced.      Findings: No rash.   Neurological:      General: No focal deficit present.      Mental Status: She is alert and oriented to person, place, and time.      Cranial Nerves: No cranial nerve deficit.      Motor: No weakness.      Gait: Gait normal.   Psychiatric:         Mood and Affect: Mood normal.         Behavior: Behavior normal.         Thought Content: Thought content normal.         Judgment: Judgment normal.           Lab Results   Component Value Date     22.4 01/02/2024     Lab Results   Component Value Date    WBC 3.71 (L) 01/02/2024    HGB 12.9 01/02/2024    HCT 39.9 01/02/2024    MCV 94 01/02/2024     01/02/2024     Lab Results   Component Value Date     (L) 08/19/2014    K 3.6 01/02/2024     01/02/2024    CO2 25 01/02/2024    ANIONGAP 6 08/19/2014    BUN 15 01/02/2024    CREATININE 0.92 01/02/2024    GLUCOSE 90 08/19/2014    GLUF 92 01/02/2024    CALCIUM 9.3 01/02/2024    AST 19 01/02/2024    ALT 14  01/02/2024    ALKPHOS 44 01/02/2024    PROT 7.6 08/19/2014    BILITOT 0.63 08/19/2014    EGFR 63 01/02/2024        Trend:  Lab Results   Component Value Date     22.4 01/02/2024

## 2024-01-12 NOTE — TELEPHONE ENCOUNTER
Return call placed. Patient notes urethral meatus discomfort. Encouraged to continued oral hydration. Will continue to closely monitor.

## 2024-01-12 NOTE — OP NOTE
OPERATIVE REPORT  PATIENT NAME: Miles Fall    :  1955  MRN: [id number]  Pt Location: [facility] OR ROOM 07    SURGERY DATE: 2024    Surgeons and Role:     * Marty Ha MD - Primary     * Bhmuika Centeno PA-C - Assisting     * Nicole Dove DO - Assisting    Preop Diagnosis:  Pelvic mass [R19.00]    Post-Op Diagnosis Codes:     * Pelvic mass [R19.00]    Procedure(s):  ROBOT. BILATERAL SALPINGO-OOPHORECTOMY    Specimen(s):  ID Type Source Tests Collected by Time Destination   2 : Right ovary and fallopian tube Tissue Ovary, Right TISSUE EXAM Marty Ha MD 2024  8:52 AM    3 : Left ovary and fallopian tube Tissue Ovary, Left TISSUE EXAM Marty Ha MD 2024  8:56 AM        Estimated Blood Loss:   10 mL    Drains:  [REMOVED] Urethral Catheter Non-latex 16 Fr. (Removed)   Number of days: 0       Anesthesia Type:   General    Operative Indications:  Pelvic mass [R19.00]  68-year-old with a 8.2 x 4.9 cm complex appearing right ovarian cyst.    Operative Findings:  1.  Exam under anesthesia revealed a gross normal cervix and mobile uterus.  2.  On laparoscopy, there was no evidence of peritoneal disease.  There were adhesions from the cecum to the right anterior abdominal wall from previous open appendectomy.  The left adnexa was grossly normal.  The uterus was grossly normal.  The right ovary was enlarged measuring approximately 8 cm in diameter.  There were no surface excrescences.  It was both complex solid and cystic.  3.  Frozen section revealed cystadenofibroma.  No evidence of malignancy.    Complications:   None    Procedure and Technique:  After informed consent was obtained, the patient was taken the operating room where general endotracheal anesthesia was administered without incident.  She was then prepped and draped in normal sterile fashion in the low dorsolithotomy position.  Examination under anesthesia was then performed findings noted as above.  A  Patterson catheter was inserted.  Attention was then turned the abdomen.  0.25% Marcaine was used to infiltrate the skin prior to placement of any trocar.  The first insertion site was approximately 3 cm superior to umbilicus in the midline.  A vertical 8 mm incision was made using 11 blade scalpel.  Through this was passed an 8 mm robotic trocar under direct visualization into the abdominal cavity.  The abdomen is then insufflated to 15 mmHg using CO2 gas.  Findings on laparoscopy are noted as above.  Additional trocars were then placed under direct visualization.  There were 2 robotic trocars placed on the left side of the abdomen and 1 on the right side of the abdomen.  A 12 mm assistant port was then placed in the right upper quadrant.  The robot was docked.  Peritoneal cytology was obtained but later discarded.  Attention was first turned to the left adnexa.  The fallopian tube was grasped and elevated.  The ureter was identified coursing normally within the medial leaf of the broad ligament.  The infundibulopelvic ligament on left side was then skeletonized, cauterized and transected.  The utero-ovarian ligament was then cauterized and transected and the left tube and ovary was placed in the cul-de-sac.  The right fallopian tube was then elevated.  There are adhesions noted between the ovary and the right pelvic sidewall.  The retroperitoneal space was opened.  The ureter was notified.  The pararectal space was developed.  The infundibulopelvic ligament on the right side was then skeletonized, cauterized and transected.  Adhesions between the ovary and the peritoneum were then lysed.  It was then noted there was a parametrial cyst.  The cyst was then freed from the uterine vasculature.  A portion of the uterine vasculature was cauterized and transected to free the cyst completely.  The utero-ovarian ligament was then skeletonized, cauterized and transected.  The robot was undocked.  The 12 mm assistant port was  removed and a 10 mm Endo Catch bag was then advanced directly into the abdominal cavity through the 12 mm port site.  Both tubes and ovaries were then placed into the Endo Catch bag and the Endo Catch bag was then exteriorized.  The bag was opened.  The cyst was ruptured for serous fluid.  Given that the solid portion of the ovary was fibromatous, it was not possible to remove the ovary piecemeal through the incision.  Therefore, the right upper quadrant incision was extended by approximately 1.5 cm.  The fascia was extended enough to allow partial extrusion of the mass.  The mass was then grasped with a [place] clamp and morcellated using an 11 blade scalpel.  The tubes and ovaries were then removed.  The right tube and ovary was sent for frozen section with results noted as above.  The left tube and ovary was sent for permanent section.  The fascia at the right upper quadrant port site was then closed using running 0 Vicryl suture with a UR 6 needle.  The abdomen was then reinsufflated.  The pelvis was irrigated.  The pressure in the pelvis was brought down to less than 5 mmHg.  There is a small amount of bleeding noted in the right pelvic peritoneum at the retroperitoneal dissection.  This was cauterized with monopolar cautery.  The remainder of the sites were hemostatic.  The gas was then removed from abdominal cavity.  The ports were then removed under direct visualization.  The skin was closed at all sites using 4-0 Monocryl followed by Exofin.  The Patterson catheter was removed.  The patient was then awakened and transferred to the recovery room in stable condition.  Ausman counts correct x 2 for procedure.  No complications.  Estimate blood loss is minimal.   I was present for the entire procedure. and A physician assistant was required during the procedure for retraction, tissue handling, dissection and suturing.    Patient Disposition:  PACU         SIGNATURE: Marty Ha MD  DATE: January 12,  2024  TIME: 9:41 AM

## 2024-01-12 NOTE — TELEPHONE ENCOUNTER
Patient Call    Who are you speaking with? Patient    If it is not the patient, are they listed on an active communication consent form? N/A   What is the reason for this call? Patient calling to speak with Dr. Ha.  She had surgery today.  She is in a lot of pain and the tylenol is not working.     Does this require a call back? Yes    If a call back is required, please list best call back number [phone]   If a call back is required, advise that a message will be forwarded to their care team and someone will return their call as soon as possible.   Did you relay this information to the patient? Yes

## 2024-01-12 NOTE — DISCHARGE INSTR - AVS FIRST PAGE
"Post-Gynecologic Surgery Discharge Instructions:  1. No heavy lifting more than one full gallon of milk (about 8 lbs) for 1 week.  2. Nothing in the vagina for 6 weeks, or until cleared at your post-operative visit  3. You may take stairs one at a time, touching each step with both feet for the first few days, then as tolerated.  4. Call the office for fever greater than 100.4'F, heavy vaginal bleeding, or increasing pain.  5. Activity as tolerated.  6. Avoid driving if taking narcotic pain medications (Roxicodone, Percocet, Vicodin).    Post Operative Pain Management:  For pain, take 650 mg of tylenol every 6 hours.   For cramping, take 600 mg Ibuprofen every 6 hours to relieve.     You may alternate these and take them \"around the clock\" to stay ahead of pain. For example, take 650mg of tylenol at 9 AM, then 600mg of ibuprofen at 12 PM, then 650 of tylenol at 3 PM, and so forth.     Post Operative Bowel Management:  Please take over the counter stool softener or laxative to ensure you do not strain, as the bowels are often the last thing to wake up from anesthesia. You can take whatever is preferred (colace, senna, or miralax)    If you have any questions regarding your prescriptions please call your doctor.   "

## 2024-01-17 PROCEDURE — 88307 TISSUE EXAM BY PATHOLOGIST: CPT | Performed by: PATHOLOGY

## 2024-01-22 ENCOUNTER — TELEPHONE (OUTPATIENT)
Dept: HEMATOLOGY ONCOLOGY | Facility: CLINIC | Age: 69
End: 2024-01-22

## 2024-01-22 NOTE — TELEPHONE ENCOUNTER
Patient Call    Who are you speaking with? Patient    If it is not the patient, are they listed on an active communication consent form? N/A   What is the reason for this call? Left upper thigh is painful.tender to the touch   Does this require a call back? Yes   If a call back is required, please list best call back number [phone]    If a call back is required, advise that a message will be forwarded to their care team and someone will return their call as soon as possible.   Did you relay this information to the patient? Yes     
Return call placed. Her left thigh is a bit numb since surgery, and there is some tenderness. No swelling, redness, or warmth. Patient will take OTC analgesics and use ice/heat, and keep an eye on the area. She will report back with any worsening pain or swelling.  
PAST MEDICAL HISTORY:  DM (diabetes mellitus), type 2     Generalized anxiety disorder     HLD (hyperlipidemia)     HTN (hypertension)     Macular degeneration     AUSTYN on CPAP

## 2024-02-08 PROBLEM — Z98.890 POSTOPERATIVE STATE: Status: ACTIVE | Noted: 2024-02-08

## 2024-02-08 NOTE — ASSESSMENT & PLAN NOTE
67-year-old with 8.2 x 4.9 cm right adenxal cyst now s/p robotic-assist BSO on 1/12/24. Her final pathology was benign and consistent with ovarian fibroma. She has recovered well from surgery.     Discussed continued post-operative lifting restrictions through 6 weeks. Return to the office PRN. Continue routine gynecologic care with routine gynecologist.

## 2024-02-08 NOTE — PROGRESS NOTES
Assessment/Plan:    Problem List Items Addressed This Visit          Other    Postoperative state - Primary     67-year-old with 8.2 x 4.9 cm right adenxal cyst now s/p robotic-assist BSO on 1/12/24. Her final pathology was benign and consistent with ovarian fibroma. She has recovered well from surgery.     Discussed continued post-operative lifting restrictions through 6 weeks. Return to the office PRN. Continue routine gynecologic care with routine gynecologist.               CHIEF COMPLAINT:  Post-operative evaluation     Problem:  8.2 x 4.9 cm right adnexal cyst      Previous therapy:  Robotic-assist BSO on 1/12/24.  A. Benign.        Patient ID: Miles Fall is a 68 y.o. female  who presents to the office for post-operative evaluation. She is without acute complaints and recovering well from surgery. She has been afebrile. Appetite is appropriate. No n/v/abdominal pain. Patient does not require OTC pain medication. She notes normal bowel/bladder function currently.        The following portions of the patient's history were reviewed and updated as appropriate: allergies, current medications, past medical history, past surgical history, and problem list.    Review of Systems   Constitutional: Negative.    Respiratory: Negative.     Cardiovascular: Negative.    Gastrointestinal: Negative.    Genitourinary: Negative.    Skin: Negative.          Current Outpatient Medications:     B Complex Vitamins (B COMPLEX PO), Take by mouth, Disp: , Rfl:     Boswellia-Glucosamine-Vit D (OSTEO BI-FLEX ONE PER DAY PO), Take by mouth EASE, Disp: , Rfl:     CALCIUM CITRATE PO, Take by mouth, Disp: , Rfl:     Cholecalciferol (Vitamin D-3) 125 MCG (5000 UT) TABS, Take by mouth 6 days a week, Disp: , Rfl:     levothyroxine 125 mcg tablet, TAKE 1/2 TABLET (62.5 MCG) BY MOUTH ONCE DAILY., Disp: , Rfl:     Magnesium 250 MG TABS, Take by mouth, Disp: , Rfl:     MILK THISTLE PO, Take by mouth, Disp: , Rfl:     NON FORMULARY, Take by  "mouth [employer] multi-element Powder form, Disp: , Rfl:     NON FORMULARY, 2 (two) times a day as needed Slippery elm bark, Disp: , Rfl:     NON FORMULARY, 2 (two) times a day Stomach and intestinal health DGL, Disp: , Rfl:     NON FORMULARY, in the morning Acid soothe, Disp: , Rfl:     NON FORMULARY, 3 (three) times a day Reflux gourmet- paste, Disp: , Rfl:     NON FORMULARY, 2 (two) times a day Thytrophin PMG, Disp: , Rfl:     NON FORMULARY, in the morning MK-7, Disp: , Rfl:     NON FORMULARY, Cold and sore throat relief, Disp: , Rfl:     NON FORMULARY, Nerve tonic, Disp: , Rfl:     NON FORMULARY, Place 1 Application on the skin Patient using Progest cream, Disp: , Rfl:     Omega-3 Fatty Acids (fish oil) 1,000 mg, Take 1 capsule by mouth daily, Disp: , Rfl:     Polyvinyl Alcohol-Povidone (REFRESH OP), Apply to eye if needed, Disp: , Rfl:     simethicone (MYLICON) 125 MG chewable tablet, Chew 125 mg every 6 (six) hours as needed for flatulence, Disp: , Rfl:     sodium chloride (OCEAN) 0.65 % nasal spray, 1 spray into each nostril as needed for congestion, Disp: , Rfl:     Turmeric Curcumin 500 MG CAPS, Take by mouth, Disp: , Rfl:     VITAMIN A PO, Take by mouth Once a week, Disp: , Rfl:     Zinc 20 MG CAPS, Take 40 mg by mouth, Disp: , Rfl:     Ascorbic Acid (Vitamin C) 500 MG CAPS, Take by mouth (Patient not taking: Reported on 1/3/2024), Disp: , Rfl:     omeprazole (PriLOSEC) 20 mg delayed release capsule, Take 1 capsule (20 mg total) by mouth daily (Patient not taking: Reported on 1/3/2024), Disp: 7 capsule, Rfl: 0    saccharomyces boulardii (FLORASTOR) 250 mg capsule, Take 250 mg by mouth in the morning (Patient not taking: Reported on 1/3/2024), Disp: , Rfl:     sucralfate (CARAFATE) 1 g tablet, Take 1 tablet (1 g total) by mouth 4 (four) times a day as needed (Take if you experience chest pain) for up to 5 days, Disp: 12 tablet, Rfl: 0    Objective:    Blood pressure 110/84, pulse 92, height 5' 4\" (1.626 " m), weight 64 kg (141 lb), SpO2 98%.  Body mass index is 24.2 kg/m².  Body surface area is 1.69 meters squared.    Physical Exam  Vitals reviewed.   Constitutional:       General: She is not in acute distress.     Appearance: Normal appearance.   HENT:      Head: Normocephalic and atraumatic.      Mouth/Throat:      Mouth: Mucous membranes are moist.   Pulmonary:      Effort: Pulmonary effort is normal.      Breath sounds: Normal breath sounds.   Abdominal:      Palpations: Abdomen is soft. There is no mass.      Tenderness: There is no abdominal tenderness.   Skin:     General: Skin is warm and dry.      Comments: Surgical trocar sites are intact, clean and dry without induration, erythema or purulent drainage.    Neurological:      Mental Status: She is alert and oriented to person, place, and time.   Psychiatric:         Mood and Affect: Mood normal.         Behavior: Behavior normal.         Thought Content: Thought content normal.         Judgment: Judgment normal.           Case Report   Surgical Pathology Report                         Case: [id number]                                   Authorizing Provider:  Marty Ha,   Collected:           01/12/2024 0852                                      MD                                                                           [facility]ing [facility]     [facility]        Received:            01/12/2024 0900                                      [facility]                                                     Pathologist:           Cadence El MD                                                       Specimens:   A) - Ovary, Right, Right ovary and fallopian tube                                                    B) - Ovary, Left, Left ovary and fallopian tube                                            Final Diagnosis   A. Ovary, Right, Right ovary and fallopian tube:  Ovary:   ovarian fibroma , 4.7 cm   Fallopian tube: benign  paratubal cyst  -No evidence of malignancy      B. Ovary, Left, Left ovary and fallopian tube:  Ovary:             benign   Fallopian tube: benign paratubal cyst  -No evidence of malignancy    Electronically signed by Cadence El MD on 1/17/2024 at  3:18 PM

## 2024-02-09 ENCOUNTER — OFFICE VISIT (OUTPATIENT)
Dept: GYNECOLOGIC ONCOLOGY | Facility: CLINIC | Age: 69
End: 2024-02-09

## 2024-02-09 VITALS
HEART RATE: 92 BPM | OXYGEN SATURATION: 98 % | DIASTOLIC BLOOD PRESSURE: 84 MMHG | WEIGHT: 141 LBS | HEIGHT: 64 IN | BODY MASS INDEX: 24.07 KG/M2 | SYSTOLIC BLOOD PRESSURE: 110 MMHG

## 2024-02-09 DIAGNOSIS — Z98.890 POSTOPERATIVE STATE: Primary | ICD-10-CM

## 2024-02-09 PROCEDURE — 99024 POSTOP FOLLOW-UP VISIT: CPT | Performed by: PHYSICIAN ASSISTANT

## 2024-02-19 ENCOUNTER — OFFICE VISIT (OUTPATIENT)
Dept: CARDIOLOGY CLINIC | Facility: CLINIC | Age: 69
End: 2024-02-19
Payer: COMMERCIAL

## 2024-02-19 VITALS
DIASTOLIC BLOOD PRESSURE: 80 MMHG | HEART RATE: 84 BPM | WEIGHT: 141 LBS | BODY MASS INDEX: 24.07 KG/M2 | SYSTOLIC BLOOD PRESSURE: 122 MMHG | HEIGHT: 64 IN

## 2024-02-19 DIAGNOSIS — R07.9 CHEST PAIN: ICD-10-CM

## 2024-02-19 DIAGNOSIS — Z82.49 FAMILY HISTORY OF EARLY CAD: ICD-10-CM

## 2024-02-19 DIAGNOSIS — R94.31 ABNORMAL EKG: Primary | ICD-10-CM

## 2024-02-19 DIAGNOSIS — E78.2 MIXED HYPERLIPIDEMIA: ICD-10-CM

## 2024-02-19 PROCEDURE — 99204 OFFICE O/P NEW MOD 45 MIN: CPT | Performed by: INTERNAL MEDICINE

## 2024-02-19 NOTE — PROGRESS NOTES
Consultation - Cardiology   Miles Fall 68 y.o. female MRN: [id number]    Encounter: [id number]    1. Abnormal EKG  Echo complete w/ contrast if indicated      2. Mixed hyperlipidemia  POCT ECG    CT coronary calcium score      3. Chest pain  Ambulatory Referral to Cardiology      4. Family history of early CAD            Assessment/Plan     Assessment:     Chest Pain  Abnormal EKG  Family history of cad.    Plan:    Chest discomfort was very atypical and has resolved. EKG shows NSR with nonspecific T wave abnormality. Recommend echocardiogram for further evaluation. Will hold off on stress testing for now since her chest discomfort has resolved.     Abnormal EKG: Check echo as above.     Family hx of CAD: Check CT calcium score.       History of Present Illness   Physician Requesting Consult: No att. providers found  Reason for Consult / Principal Problem: Chest pain  HPI: Miles Fall is a 68 y.o. year old female who presents with after an ER visit for chest discomfort. This was thought to be secondary to gastric reflux. She has not had any further chest discomfort. She was asked to have cardiology followup. Her EKG in the hospital revealed normal sinus rhythm with  nonspecific T wave abnormality.     Family history: No premature CAD. Brother has CAD in his early 70s.     Review of Systems   Constitutional: Negative.   HENT: Negative.     Eyes: Negative.    Cardiovascular: Negative.    Respiratory: Negative.     Endocrine: Negative.    Hematologic/Lymphatic: Negative.    Skin: Negative.    Musculoskeletal: Negative.    Gastrointestinal: Negative.    Genitourinary: Negative.    Neurological: Negative.    Psychiatric/Behavioral: Negative.     Allergic/Immunologic: Negative.        Historical Information   Past Medical History:   Diagnosis Date    Anesthesia complication     very sore throat after surgery in [facility]    Anxiety and depression     Dry eyes     Environmental and seasonal allergies     Fall      7/2022    GERD (gastroesophageal reflux disease)     Hyperlipidemia     Hypertension     Hypothyroidism     Hashimoto's    Lower extremity weakness     Osteoarthritis     Osteopenia     Psoriasis     Scalp    Sjogren's syndrome without extraglandular involvement (HCC)     Urinary, incontinence, stress female     UTI (urinary tract infection)      Past Surgical History:   Procedure Laterality Date    ADENOIDECTOMY      APPENDECTOMY N/A     BREAST MASS EXCISION Left     Benign    EGD AND COLONOSCOPY      12/2023    INGUINAL HERNIA REPAIR Left     NH LAPS TOTAL HYSTERECT 250 GM/< W/RMVL TUBE/OVARY N/A 1/12/2024    Procedure: ROBOT, BILATERAL SALPINGO-OOPHORECTOMY;  Surgeon: Marty Ha MD;  Location: [facility];  Service: Gynecology Oncology    TUBAL LIGATION         Social History:  Social History     Substance and Sexual Activity   Alcohol Use Yes    Comment: Rarely     Social History     Substance and Sexual Activity   Drug Use Not Currently     Social History     Tobacco Use   Smoking Status Never   Smokeless Tobacco Never       Family History:   Family History   Problem Relation Age of Onset    Cervical cancer Mother     Hypothyroidism Mother     Migraines Mother     Heart disease Mother     Gallbladder disease Mother     Peripheral vascular disease Mother     Arthritis Mother     Osteoporosis Mother     Stroke Father     Hypertension Father     Depression Sister     Cancer Sister         Bladder    Hypertension Brother     Hyperlipidemia Brother     Coronary artery disease Brother     Psoriasis Brother     Hypertension Brother     Hyperlipidemia Brother        Meds/Allergies   Allergies   Allergen Reactions    Ciprofloxacin Other (See Comments)     Nightmares    Citrus - Food Allergy GI Intolerance    Dust Mite Extract Other (See Comments)    Gluten Meal - Food Allergy Other (See Comments)     Autoimmune issue     Milk-Related Compounds - Food Allergy Allergic Rhinitis     Dairy products    Molds  & Smuts Other (See Comments)    Mushroom Extract Complex - Food Allergy Other (See Comments)    Shrimp Extract Allergy Skin Test - Food Allergy Other (See Comments)    Wheat Bran - Food Allergy Other (See Comments)       Current Outpatient Medications:     Ascorbic Acid (Vitamin C) 500 MG CAPS, Take by mouth, Disp: , Rfl:     B Complex Vitamins (B COMPLEX PO), Take by mouth, Disp: , Rfl:     Boswellia-Glucosamine-Vit D (OSTEO BI-FLEX ONE PER DAY PO), Take by mouth EASE, Disp: , Rfl:     CALCIUM CITRATE PO, Take by mouth, Disp: , Rfl:     Cholecalciferol (Vitamin D-3) 125 MCG (5000 UT) TABS, Take by mouth 6 days a week, Disp: , Rfl:     levothyroxine 125 mcg tablet, TAKE 1/2 TABLET (62.5 MCG) BY MOUTH ONCE DAILY., Disp: , Rfl:     Magnesium 250 MG TABS, Take by mouth, Disp: , Rfl:     MILK THISTLE PO, Take by mouth, Disp: , Rfl:     NON FORMULARY, Take by mouth [facility] [facility] multi-element Powder form, Disp: , Rfl:     NON FORMULARY, 2 (two) times a day as needed Slippery elm bark, Disp: , Rfl:     NON FORMULARY, 2 (two) times a day Stomach and intestinal health DGL, Disp: , Rfl:     NON FORMULARY, in the morning Acid soothe, Disp: , Rfl:     NON FORMULARY, 3 (three) times a day Reflux gourmet- paste, Disp: , Rfl:     NON FORMULARY, 2 (two) times a day Thytrophin PMG, Disp: , Rfl:     NON FORMULARY, in the morning MK-7, Disp: , Rfl:     NON FORMULARY, Cold and sore throat relief, Disp: , Rfl:     NON FORMULARY, Nerve tonic, Disp: , Rfl:     NON FORMULARY, Place 1 Application on the skin Patient using Progest cream, Disp: , Rfl:     Omega-3 Fatty Acids (fish oil) 1,000 mg, Take 1 capsule by mouth daily, Disp: , Rfl:     Polyvinyl Alcohol-Povidone (REFRESH OP), Apply to eye if needed, Disp: , Rfl:     saccharomyces boulardii (FLORASTOR) 250 mg capsule, Take 250 mg by mouth in the morning, Disp: , Rfl:     sodium chloride (OCEAN) 0.65 % nasal spray, 1 spray into each nostril as needed for congestion, Disp: , Rfl:      Turmeric Curcumin 500 MG CAPS, Take by mouth, Disp: , Rfl:     VITAMIN A PO, Take by mouth Once a week, Disp: , Rfl:     Zinc 20 MG CAPS, Take 40 mg by mouth, Disp: , Rfl:     omeprazole (PriLOSEC) 20 mg delayed release capsule, Take 1 capsule (20 mg total) by mouth daily (Patient not taking: Reported on 1/3/2024), Disp: 7 capsule, Rfl: 0    simethicone (MYLICON) 125 MG chewable tablet, Chew 125 mg every 6 (six) hours as needed for flatulence (Patient not taking: Reported on 2/19/2024), Disp: , Rfl:     sucralfate (CARAFATE) 1 g tablet, Take 1 tablet (1 g total) by mouth 4 (four) times a day as needed (Take if you experience chest pain) for up to 5 days, Disp: 12 tablet, Rfl: 0    Vitals:   Pulse: 84  Blood Pressue: 122/80  Weight: 64 kg (141 lb)      Physical Exam  Constitutional:       General: She is not in acute distress.     Appearance: She is well-developed. She is not diaphoretic.   HENT:      Head: Normocephalic.   Eyes:      General: No scleral icterus.        Right eye: No discharge.      Conjunctiva/sclera: Conjunctivae normal.   Neck:      Vascular: No JVD.   Cardiovascular:      Rate and Rhythm: Normal rate and regular rhythm.      Heart sounds: No murmur heard.     No friction rub. No gallop.   Pulmonary:      Effort: Pulmonary effort is normal. No respiratory distress.      Breath sounds: Normal breath sounds. No wheezing or rales.   Abdominal:      General: Bowel sounds are normal. There is no distension.      Palpations: Abdomen is soft.      Tenderness: There is no abdominal tenderness. There is no rebound.   Musculoskeletal:         General: No tenderness or deformity.      Cervical back: Normal range of motion.   Skin:     General: Skin is warm and dry.   Neurological:      Mental Status: She is alert and oriented to person, place, and time.         [unfilled]    Invasive Devices       None                   Lab Results   Component Value Date     (L) 08/19/2014     01/02/2024    CO2 25  "01/02/2024    ANIONGAP 6 08/19/2014    BUN 15 01/02/2024    CREATININE 0.92 01/02/2024    EGFR 63 01/02/2024    GLUCOSE 90 08/19/2014    CALCIUM 9.3 01/02/2024    AST 19 01/02/2024    ALT 14 01/02/2024    ALKPHOS 44 01/02/2024    PROT 7.6 08/19/2014    BILITOT 0.63 08/19/2014     Lab Results   Component Value Date    WBC 3.71 (L) 01/02/2024    HGB 12.9 01/02/2024     01/02/2024     No components found for: \"TROP\"    Imaging:     EKG: Normal Sinus Rhythm.     Counseling / Coordination of Care  Total floor / unit time spent today 45 minutes.  Greater than 50% of total time was spent with the patient and / or family counseling and / or coordination of care.  A description of the counseling / coordination of care.      "

## 2024-02-21 PROCEDURE — 93000 ELECTROCARDIOGRAM COMPLETE: CPT | Performed by: INTERNAL MEDICINE

## 2024-04-08 ENCOUNTER — HOSPITAL ENCOUNTER (OUTPATIENT)
Dept: CT IMAGING | Facility: HOSPITAL | Age: 69
Discharge: HOME | End: 2024-04-08
Attending: INTERNAL MEDICINE
Payer: COMMERCIAL

## 2024-04-08 ENCOUNTER — HOSPITAL ENCOUNTER (OUTPATIENT)
Dept: NON INVASIVE DIAGNOSTICS | Facility: CLINIC | Age: 69
Discharge: HOME | End: 2024-04-08
Payer: COMMERCIAL

## 2024-04-08 VITALS
DIASTOLIC BLOOD PRESSURE: 80 MMHG | HEART RATE: 70 BPM | HEIGHT: 64 IN | SYSTOLIC BLOOD PRESSURE: 122 MMHG | BODY MASS INDEX: 24.09 KG/M2 | WEIGHT: 141.09 LBS

## 2024-04-08 DIAGNOSIS — R94.31 ABNORMAL EKG: ICD-10-CM

## 2024-04-08 DIAGNOSIS — E78.2 MIXED HYPERLIPIDEMIA: ICD-10-CM

## 2024-04-08 LAB
AORTIC ROOT: 2.6 CM
APICAL FOUR CHAMBER EJECTION FRACTION: 55 %
ASCENDING AORTA: 2.6 CM
BSA FOR ECHO PROCEDURE: 1.69 M2
E WAVE DECELERATION TIME: 267 MS
E/A RATIO: 0.92
FRACTIONAL SHORTENING: 36 (ref 28–44)
INTERVENTRICULAR SEPTUM IN DIASTOLE (PARASTERNAL SHORT AXIS VIEW): 0.7 CM
INTERVENTRICULAR SEPTUM: 0.7 CM (ref 0.6–1.1)
LAAS-AP2: 15.8 CM2
LAAS-AP4: 15.8 CM2
LEFT ATRIUM AREA SYSTOLE SINGLE PLANE A4C: 15.9 CM2
LEFT ATRIUM SIZE: 2.4 CM
LEFT ATRIUM VOLUME (MOD BIPLANE): 43 ML
LEFT ATRIUM VOLUME INDEX (MOD BIPLANE): 25.4 ML/M2
LEFT INTERNAL DIMENSION IN SYSTOLE: 2.7 CM (ref 2.1–4)
LEFT VENTRICULAR INTERNAL DIMENSION IN DIASTOLE: 4.2 CM (ref 3.5–6)
LEFT VENTRICULAR POSTERIOR WALL IN END DIASTOLE: 0.7 CM
LEFT VENTRICULAR STROKE VOLUME: 54 ML
LVSV (TEICH): 54 ML
MV E'TISSUE VEL-SEP: 7 CM/S
MV PEAK A VEL: 0.62 M/S
MV PEAK E VEL: 57 CM/S
MV STENOSIS PRESSURE HALF TIME: 77 MS
MV VALVE AREA P 1/2 METHOD: 2.86
RIGHT ATRIUM AREA SYSTOLE A4C: 13.7 CM2
RIGHT VENTRICLE ID DIMENSION: 2.3 CM
SL CV LEFT ATRIUM LENGTH A2C: 4.9 CM
SL CV LV EF: 60
SL CV PED ECHO LEFT VENTRICLE DIASTOLIC VOLUME (MOD BIPLANE) 2D: 81 ML
SL CV PED ECHO LEFT VENTRICLE SYSTOLIC VOLUME (MOD BIPLANE) 2D: 27 ML
TR MAX PG: 20 MMHG
TR PEAK VELOCITY: 2.2 M/S
TRICUSPID ANNULAR PLANE SYSTOLIC EXCURSION: 1.8 CM
TRICUSPID VALVE PEAK REGURGITATION VELOCITY: 2.22 M/S

## 2024-04-08 PROCEDURE — 75571 CT HRT W/O DYE W/CA TEST: CPT

## 2024-04-08 PROCEDURE — 93306 TTE W/DOPPLER COMPLETE: CPT | Performed by: INTERNAL MEDICINE

## 2024-04-08 PROCEDURE — 93306 TTE W/DOPPLER COMPLETE: CPT

## 2024-04-08 PROCEDURE — G1004 CDSM NDSC: HCPCS

## 2024-04-23 ENCOUNTER — TELEPHONE (OUTPATIENT)
Dept: ADMINISTRATIVE | Facility: OTHER | Age: 69
End: 2024-04-23

## 2024-04-23 ENCOUNTER — OFFICE VISIT (OUTPATIENT)
Dept: INTERNAL MEDICINE CLINIC | Facility: CLINIC | Age: 69
End: 2024-04-23
Payer: COMMERCIAL

## 2024-04-23 VITALS
HEIGHT: 64 IN | BODY MASS INDEX: 24.34 KG/M2 | SYSTOLIC BLOOD PRESSURE: 124 MMHG | HEART RATE: 60 BPM | OXYGEN SATURATION: 99 % | WEIGHT: 142.6 LBS | DIASTOLIC BLOOD PRESSURE: 82 MMHG

## 2024-04-23 DIAGNOSIS — R73.03 PREDIABETES: ICD-10-CM

## 2024-04-23 DIAGNOSIS — M06.09 SERONEGATIVE ARTHROPATHY OF MULTIPLE SITES (HCC): ICD-10-CM

## 2024-04-23 DIAGNOSIS — E03.8 HYPOTHYROIDISM DUE TO HASHIMOTO'S THYROIDITIS: Primary | ICD-10-CM

## 2024-04-23 DIAGNOSIS — R19.00 PELVIC MASS: ICD-10-CM

## 2024-04-23 DIAGNOSIS — E78.5 HYPERLIPIDEMIA, UNSPECIFIED HYPERLIPIDEMIA TYPE: ICD-10-CM

## 2024-04-23 DIAGNOSIS — E06.3 HYPOTHYROIDISM DUE TO HASHIMOTO'S THYROIDITIS: Primary | ICD-10-CM

## 2024-04-23 DIAGNOSIS — M35.00 SJOGREN'S SYNDROME WITHOUT EXTRAGLANDULAR INVOLVEMENT (HCC): ICD-10-CM

## 2024-04-23 DIAGNOSIS — I10 PRIMARY HYPERTENSION: ICD-10-CM

## 2024-04-23 PROCEDURE — 99214 OFFICE O/P EST MOD 30 MIN: CPT | Performed by: INTERNAL MEDICINE

## 2024-04-23 NOTE — TELEPHONE ENCOUNTER
Upon review of the In Basket request we were able to locate, review, and update the patient chart as requested for DEXA Scan.    Any additional questions or concerns should be emailed to the Practice Liaisons via the appropriate education email address, please do not reply via In Basket.    Thank you  Anthony Newman MA

## 2024-04-23 NOTE — TELEPHONE ENCOUNTER
----- Message from Feliciano Almaguer MD sent at 4/23/2024 11:04 AM EDT -----  Regarding: Care Gap Request  04/23/24 11:04 AM    Hello, our patient attached above has had DEXA Scan completed/performed. Please assist in updating the patient chart by pulling the Care Everywhere (CE) document. The date of service is 9/26/23.     Thank you,  Feliciano Almaguer MD  [facility]

## 2024-04-23 NOTE — PATIENT INSTRUCTIONS
-Please consider a trial of a low carbohydrate diet of 100-150 grams daily   -Look to consume 1-1.3 grams or protein per kilogram   -A lipid panel has been ordered

## 2024-04-23 NOTE — ASSESSMENT & PLAN NOTE
-Check 6 month f/u A1c over the summer  -Last A1c 6.3  -Discussed trial of a low carb diet 100-150g daily

## 2024-04-23 NOTE — PROGRESS NOTES
Name: Miles Fall      : 1955      MRN: [id number]  Encounter Provider: Feliciano Almaguer MD  Encounter Date: 2024   Encounter department: [facility]    Assessment & Plan     1. Hypothyroidism due to Hashimoto's thyroiditis  Assessment & Plan:  -Last TSH within normal range   -Continue current dose of Synthroid       2. Primary hypertension  Assessment & Plan:  -Blood pressure controlled off of antihypertensives  -Continue to monitor      3. Seronegative arthropathy of multiple sites (HCC)  Assessment & Plan:  -Clinically stable   -Appreciate Rheumatology f/u       4. Sjogren's syndrome without extraglandular involvement (HCC)  Assessment & Plan:  -Chronic dry eye  -followed by Ophthalmology       5. Prediabetes  Assessment & Plan:  -Check 6 month f/u A1c over the summer  -Last A1c 6.3  -Discussed trial of a low carb diet 100-150g daily    Orders:  -     Insulin, fasting; Future    6. Hyperlipidemia, unspecified hyperlipidemia type  Assessment & Plan:  -Check lipid panel, last panel obtained in   -Coronary calcium score 0 ()    Orders:  -     Lipid Panel with Direct LDL reflex; Future    7. Pelvic mass  Assessment & Plan:  -s/p resection, pathology benign  -followed by Gyn/Onc                Subjective      HPI  Patient presents today for chronic follow-up.  Since her last visit she underwent resection of a pelvic mass with Dr. Ha on 2024.  The pathology of her mass was benign.  She states the procedure went well and there were no major complications.    She was also recently seen by cardiology on  for evaluation of chest pain.  She underwent a cardiac CT and her calcium score was 0.  She also underwent an echocardiogram which revealed an EF of 60%.  Her diastolic function was normal and no significant valvular disease was seen.    All other systems negative except for pertinent findings noted in HPI.       Current Outpatient Medications on File Prior  to Visit   Medication Sig   • Ascorbic Acid (Vitamin C) 500 MG CAPS Take by mouth   • B Complex Vitamins (B COMPLEX PO) Take by mouth   • Boswellia-Glucosamine-Vit D (OSTEO BI-FLEX ONE PER DAY PO) Take by mouth EASE   • CALCIUM CITRATE PO Take by mouth   • Cholecalciferol (Vitamin D-3) 125 MCG (5000 UT) TABS Take by mouth 6 days a week   • levothyroxine 125 mcg tablet TAKE 1/2 TABLET (62.5 MCG) BY MOUTH ONCE DAILY.   • Magnesium 250 MG TABS Take by mouth   • MILK THISTLE PO Take by mouth   • NON FORMULARY Take by mouth [facility] multi-element Powder form   • NON FORMULARY 2 (two) times a day as needed Slippery elm bark   • NON FORMULARY 2 (two) times a day Stomach and intestinal health DGL   • NON FORMULARY in the morning Acid soothe   • NON FORMULARY 3 (three) times a day Reflux gourmet- paste   • NON FORMULARY 2 (two) times a day Thytrophin PMG   • NON FORMULARY in the morning MK-7   • NON FORMULARY Cold and sore throat relief   • NON FORMULARY Nerve tonic   • NON FORMULARY Place 1 Application on the skin Patient using Progest cream   • Omega-3 Fatty Acids (fish oil) 1,000 mg Take 1 capsule by mouth daily   • Polyvinyl Alcohol-Povidone (REFRESH OP) Apply to eye if needed   • saccharomyces boulardii (FLORASTOR) 250 mg capsule Take 250 mg by mouth in the morning   • sodium chloride (OCEAN) 0.65 % nasal spray 1 spray into each nostril as needed for congestion   • Turmeric Curcumin 500 MG CAPS Take by mouth   • Zinc 20 MG CAPS Take 40 mg by mouth   • omeprazole (PriLOSEC) 20 mg delayed release capsule Take 1 capsule (20 mg total) by mouth daily (Patient not taking: Reported on 1/3/2024)   • simethicone (MYLICON) 125 MG chewable tablet Chew 125 mg every 6 (six) hours as needed for flatulence (Patient not taking: Reported on 2/19/2024)   • sucralfate (CARAFATE) 1 g tablet Take 1 tablet (1 g total) by mouth 4 (four) times a day as needed (Take if you experience chest pain) for up to 5 days   • VITAMIN A PO Take by  "mouth Once a week       Objective     /82 (BP Location: Left arm, Patient Position: Sitting, Cuff Size: Standard)   Pulse 60   Ht 5' 4\" (1.626 m)   Wt 64.7 kg (142 lb 9.6 oz)   SpO2 99%   BMI 24.48 kg/m²     BP Readings from Last 3 Encounters:   04/23/24 124/82   04/08/24 122/80   02/19/24 122/80        Wt Readings from Last 3 Encounters:   04/23/24 64.7 kg (142 lb 9.6 oz)   04/08/24 64 kg (141 lb 1.5 oz)   02/19/24 64 kg (141 lb)       Physical Exam    General: NAD  HEENT: NCAT, EOMI, normal conjunctiva  Cardiovascular: RRR, normal S1 and S2, no m/r/g  Pulmonary: Normal respiratory effort, no wheezes, rales or rhonchi  GI: Soft, nontender, nondistended, normoactive bowel sounds  MSK: Normal bulk and tone  Neuro: Non-focal, ambulating without difficulty, non-antalgic gait  Extremities: No lower extremity edema  Skin: Normal skin color, no rashes     Feliciano Almaguer MD  "

## 2024-10-29 ENCOUNTER — APPOINTMENT (OUTPATIENT)
Dept: LAB | Age: 69
End: 2024-10-29
Payer: COMMERCIAL

## 2024-10-29 DIAGNOSIS — R73.03 PREDIABETES: ICD-10-CM

## 2024-10-29 DIAGNOSIS — E06.3 HYPOTHYROIDISM DUE TO HASHIMOTO'S THYROIDITIS: ICD-10-CM

## 2024-10-29 DIAGNOSIS — M06.09 SERONEGATIVE ARTHROPATHY OF MULTIPLE SITES (HCC): ICD-10-CM

## 2024-10-29 DIAGNOSIS — E78.5 HYPERLIPIDEMIA, UNSPECIFIED HYPERLIPIDEMIA TYPE: ICD-10-CM

## 2024-10-29 LAB
ALBUMIN SERPL BCG-MCNC: 4.2 G/DL (ref 3.5–5)
ALP SERPL-CCNC: 43 U/L (ref 34–104)
ALT SERPL W P-5'-P-CCNC: 16 U/L (ref 7–52)
ANION GAP SERPL CALCULATED.3IONS-SCNC: 6 MMOL/L (ref 4–13)
AST SERPL W P-5'-P-CCNC: 18 U/L (ref 13–39)
BASOPHILS # BLD AUTO: 0.02 THOUSANDS/ΜL (ref 0–0.1)
BASOPHILS NFR BLD AUTO: 1 % (ref 0–1)
BILIRUB SERPL-MCNC: 0.82 MG/DL (ref 0.2–1)
BUN SERPL-MCNC: 16 MG/DL (ref 5–25)
CALCIUM SERPL-MCNC: 8.7 MG/DL (ref 8.4–10.2)
CHLORIDE SERPL-SCNC: 104 MMOL/L (ref 96–108)
CHOLEST SERPL-MCNC: 256 MG/DL
CO2 SERPL-SCNC: 26 MMOL/L (ref 21–32)
CREAT SERPL-MCNC: 0.86 MG/DL (ref 0.6–1.3)
CRP SERPL QL: 1.1 MG/L
EOSINOPHIL # BLD AUTO: 0.12 THOUSAND/ΜL (ref 0–0.61)
EOSINOPHIL NFR BLD AUTO: 4 % (ref 0–6)
ERYTHROCYTE [DISTWIDTH] IN BLOOD BY AUTOMATED COUNT: 13.2 % (ref 11.6–15.1)
ERYTHROCYTE [SEDIMENTATION RATE] IN BLOOD: 29 MM/HOUR (ref 0–29)
EST. AVERAGE GLUCOSE BLD GHB EST-MCNC: 123 MG/DL
GFR SERPL CREATININE-BSD FRML MDRD: 69 ML/MIN/1.73SQ M
GLUCOSE P FAST SERPL-MCNC: 84 MG/DL (ref 65–99)
HBA1C MFR BLD: 5.9 %
HCT VFR BLD AUTO: 40.3 % (ref 34.8–46.1)
HDLC SERPL-MCNC: 82 MG/DL
HGB BLD-MCNC: 13.2 G/DL (ref 11.5–15.4)
IMM GRANULOCYTES # BLD AUTO: 0.01 THOUSAND/UL (ref 0–0.2)
IMM GRANULOCYTES NFR BLD AUTO: 0 % (ref 0–2)
INSULIN SERPL-ACNC: 2.68 UIU/ML (ref 1.9–23)
LDLC SERPL CALC-MCNC: 161 MG/DL (ref 0–100)
LYMPHOCYTES # BLD AUTO: 1.19 THOUSANDS/ΜL (ref 0.6–4.47)
LYMPHOCYTES NFR BLD AUTO: 35 % (ref 14–44)
MCH RBC QN AUTO: 31.7 PG (ref 26.8–34.3)
MCHC RBC AUTO-ENTMCNC: 32.8 G/DL (ref 31.4–37.4)
MCV RBC AUTO: 97 FL (ref 82–98)
MONOCYTES # BLD AUTO: 0.33 THOUSAND/ΜL (ref 0.17–1.22)
MONOCYTES NFR BLD AUTO: 10 % (ref 4–12)
NEUTROPHILS # BLD AUTO: 1.77 THOUSANDS/ΜL (ref 1.85–7.62)
NEUTS SEG NFR BLD AUTO: 50 % (ref 43–75)
NRBC BLD AUTO-RTO: 0 /100 WBCS
PLATELET # BLD AUTO: 274 THOUSANDS/UL (ref 149–390)
PMV BLD AUTO: 9.5 FL (ref 8.9–12.7)
POTASSIUM SERPL-SCNC: 3.9 MMOL/L (ref 3.5–5.3)
PROT SERPL-MCNC: 6.7 G/DL (ref 6.4–8.4)
RBC # BLD AUTO: 4.16 MILLION/UL (ref 3.81–5.12)
SODIUM SERPL-SCNC: 136 MMOL/L (ref 135–147)
TRIGL SERPL-MCNC: 64 MG/DL
TSH SERPL DL<=0.05 MIU/L-ACNC: 1.77 UIU/ML (ref 0.45–4.5)
WBC # BLD AUTO: 3.44 THOUSAND/UL (ref 4.31–10.16)

## 2024-10-29 PROCEDURE — 80053 COMPREHEN METABOLIC PANEL: CPT

## 2024-10-29 PROCEDURE — 83036 HEMOGLOBIN GLYCOSYLATED A1C: CPT

## 2024-10-29 PROCEDURE — 36415 COLL VENOUS BLD VENIPUNCTURE: CPT

## 2024-10-29 PROCEDURE — 85652 RBC SED RATE AUTOMATED: CPT

## 2024-10-29 PROCEDURE — 84443 ASSAY THYROID STIM HORMONE: CPT

## 2024-10-29 PROCEDURE — 86140 C-REACTIVE PROTEIN: CPT

## 2024-10-29 PROCEDURE — 85025 COMPLETE CBC W/AUTO DIFF WBC: CPT

## 2024-10-29 PROCEDURE — 80061 LIPID PANEL: CPT

## 2024-10-29 PROCEDURE — 83525 ASSAY OF INSULIN: CPT

## 2024-10-31 ENCOUNTER — OFFICE VISIT (OUTPATIENT)
Age: 69
End: 2024-10-31
Payer: COMMERCIAL

## 2024-10-31 VITALS
HEART RATE: 64 BPM | SYSTOLIC BLOOD PRESSURE: 122 MMHG | DIASTOLIC BLOOD PRESSURE: 74 MMHG | HEIGHT: 64 IN | WEIGHT: 150 LBS | OXYGEN SATURATION: 96 % | BODY MASS INDEX: 25.61 KG/M2 | TEMPERATURE: 97.2 F

## 2024-10-31 DIAGNOSIS — M85.89 OSTEOPENIA OF MULTIPLE SITES: ICD-10-CM

## 2024-10-31 DIAGNOSIS — M35.00 SJOGREN'S SYNDROME WITHOUT EXTRAGLANDULAR INVOLVEMENT (HCC): ICD-10-CM

## 2024-10-31 DIAGNOSIS — M47.816 LUMBAR SPONDYLOSIS: ICD-10-CM

## 2024-10-31 DIAGNOSIS — M06.09 SERONEGATIVE ARTHROPATHY OF MULTIPLE SITES (HCC): Primary | ICD-10-CM

## 2024-10-31 DIAGNOSIS — E55.9 VITAMIN D INSUFFICIENCY: ICD-10-CM

## 2024-10-31 DIAGNOSIS — M15.0 PRIMARY GENERALIZED (OSTEO)ARTHRITIS: ICD-10-CM

## 2024-10-31 DIAGNOSIS — L40.0 PLAQUE PSORIASIS: ICD-10-CM

## 2024-10-31 DIAGNOSIS — E06.3 HYPOTHYROIDISM DUE TO HASHIMOTO'S THYROIDITIS: ICD-10-CM

## 2024-10-31 PROCEDURE — 99214 OFFICE O/P EST MOD 30 MIN: CPT | Performed by: INTERNAL MEDICINE

## 2024-10-31 NOTE — PATIENT INSTRUCTIONS
I gave you a prescription for an updated DEXA which is due at the end of September next year.  It should be done at the same facility as your last 1 so it can be compared.  I would schedule that in the late spring or early summer.  I also gave you a slip for lab work to get towards the end of October next year as I will see you back at the very end of October to review results of your DEXA and lab work.  I would suggest for your muscular back symptoms that you go for medical muscle massage.  We will give you the reference and contact information for [facility] [place].  Follow-up with endocrinology and primary care.  If you decide you do want to go for either occupational therapy or physical therapy, you can call the office for a prescription.  Continue your home exercise program as tolerated.  Continue calcium and vitamin D supplement.

## 2024-10-31 NOTE — PROGRESS NOTES
Assessment/Plan:    Seronegative arthropathy of multiple sites (HCC)  No evidence for inflammatory arthropathy or connective tissue disease syndrome by labs or imaging or clinical history and physical exam.  No evidence for psoriatic arthritis either.  Osteoarthritis significant first CMC joints bilaterally. She has not gone back for additional OT recommended at the prior visit.  Could consider Depo steroid injections if symptoms worsen.  Follow-up 12 months or earlier if needed.    Primary generalized (osteo)arthritis  Primary generalized osteoarthritis with interphalangeal osteoarthritis hands and feet, and cervical and lumbar spine spondylosis.  No evidence for inflammatory arthritis or connective tissue disease by x-rays or lab work.  She has not gone for PT recommended at the last office visit.  She did seem to be interested in formal medical muscle massage instead, and I have given her contact information for an excellent therapist.  Plan follow-up in 12 months or sooner if needed.  Patient will call should she decide that she would like to go for formal physical therapy. Continue to monitor.     Plaque psoriasis  Plaque psoriasis base of scalp with remote history heel pain.  With no evidence clinically for psoriatic arthritis.  She did follow-up with derm for scalp Psoriasis and was given a prescription for mometasone.  She did have recent skin involvement left ear which responded to topical steroids. Continue to monitor for signs and symptoms of psoriatic arthritis.  Follow-up dermatology as needed.    Osteopenia of multiple sites  Follow up DEXA dated 9/26/2023 significant for osteopenia with low FRAX fracture risk.  History closed avulsion fracture lateral malleolus traumatic, not considered an osteoporotic fracture.  Metabolic workup unremarkable. No history of osteoporotic fracture.  Monitor DEXA every 2 years.  She will be due in late September 2025.  Continue calcium and vitamin D supplement.  Continue  home exercise program as tolerated.  Follow-up in October 2025 to review results of DEXA. Continue to monitor.    Lumbar spondylosis  X-rays significant for lumbar spondylosis with no evidence for inflammatory back disease.  SI joints with no evidence of sacroiliitis.  She has not gone for physical therapy ordered at the time of the last visit.  She feels that her current symptoms are related to too much guarding.  I did give her a recommendation for a medical massage therapist, who she seemed interested in going to, and likely she would provide benefit for her ongoing symptoms.  Patient will call should she decide to go for formal physical therapy.  Continue to monitor.    Sjogren's syndrome without extraglandular involvement (HCC)  Dry eyes likely secondary to autoimmune thyroid disease/secondary Sjogren's.  Chronic dry eyes and dry mouth.  She does note dryness in her nose.  Encourage saline nasal spray for symptomatic relief.  Encourage eyedrops as needed for dry eyes.  Encourage vigilant dental hygiene and follow-up dentist every 6 months.  Continue to monitor.    Hypothyroidism due to Hashimoto's thyroiditis  Hashimoto's thyroiditis with positive TPO antibody on levothyroxine.  Monitor thyroid function studies every 6 to 12 months per primary  care physician.  Encourage continuation of gluten-free diet in view of association of gluten sensitivity in patients with Hashimoto's thyroiditis.  Encourage the anti-inflammatory diet which would be helpful for GI symptoms as well.  Continue to monitor.         Problem List Items Addressed This Visit       Primary generalized (osteo)arthritis     Primary generalized osteoarthritis with interphalangeal osteoarthritis hands and feet, and cervical and lumbar spine spondylosis.  No evidence for inflammatory arthritis or connective tissue disease by x-rays or lab work.  She has not gone for PT recommended at the last office visit.  She did seem to be interested in formal  medical muscle massage instead, and I have given her contact information for an excellent therapist.  Plan follow-up in 12 months or sooner if needed.  Patient will call should she decide that she would like to go for formal physical therapy. Continue to monitor.          Plaque psoriasis     Plaque psoriasis base of scalp with remote history heel pain.  With no evidence clinically for psoriatic arthritis.  She did follow-up with derm for scalp Psoriasis and was given a prescription for mometasone.  She did have recent skin involvement left ear which responded to topical steroids. Continue to monitor for signs and symptoms of psoriatic arthritis.  Follow-up dermatology as needed.         Osteopenia of multiple sites     Follow up DEXA dated 9/26/2023 significant for osteopenia with low FRAX fracture risk.  History closed avulsion fracture lateral malleolus traumatic, not considered an osteoporotic fracture.  Metabolic workup unremarkable. No history of osteoporotic fracture.  Monitor DEXA every 2 years.  She will be due in late September 2025.  Continue calcium and vitamin D supplement.  Continue home exercise program as tolerated.  Follow-up in October 2025 to review results of DEXA. Continue to monitor.         Relevant Orders    DXA bone density spine hip and pelvis    Hypothyroidism due to Hashimoto's thyroiditis     Hashimoto's thyroiditis with positive TPO antibody on levothyroxine.  Monitor thyroid function studies every 6 to 12 months per primary  care physician.  Encourage continuation of gluten-free diet in view of association of gluten sensitivity in patients with Hashimoto's thyroiditis.  Encourage the anti-inflammatory diet which would be helpful for GI symptoms as well.  Continue to monitor.         Seronegative arthropathy of multiple sites (HCC) - Primary     No evidence for inflammatory arthropathy or connective tissue disease syndrome by labs or imaging or clinical history and physical exam.  No  evidence for psoriatic arthritis either.  Osteoarthritis significant first CMC joints bilaterally. She has not gone back for additional OT recommended at the prior visit.  Could consider Depo steroid injections if symptoms worsen.  Follow-up 12 months or earlier if needed.         Relevant Orders    C-reactive protein    Sedimentation rate, automated    CBC and differential    Comprehensive metabolic panel    Urinalysis with microscopic    Sjogren's syndrome without extraglandular involvement (HCC)     Dry eyes likely secondary to autoimmune thyroid disease/secondary Sjogren's.  Chronic dry eyes and dry mouth.  She does note dryness in her nose.  Encourage saline nasal spray for symptomatic relief.  Encourage eyedrops as needed for dry eyes.  Encourage vigilant dental hygiene and follow-up dentist every 6 months.  Continue to monitor.         Lumbar spondylosis     X-rays significant for lumbar spondylosis with no evidence for inflammatory back disease.  SI joints with no evidence of sacroiliitis.  She has not gone for physical therapy ordered at the time of the last visit.  She feels that her current symptoms are related to too much guarding.  I did give her a recommendation for a medical massage therapist, who she seemed interested in going to, and likely she would provide benefit for her ongoing symptoms.  Patient will call should she decide to go for formal physical therapy.  Continue to monitor.          Other Visit Diagnoses       Vitamin D insufficiency        Relevant Orders    Vitamin D 25 hydroxy                Reviewed records, labs, and imaging with the patient in detail.  Counseled patient.  Discussion regarding my findings and recommendations.  Office visit with documentation 35 min.    Subjective:      Patient ID: Miles Fall is a 68 y.o. female.    Patient with osteopenia, with history of closed avulsion fracture lateral malleolus distal tip right fibula with sprain ATFL August 2022 after a slip  and fall on her kitchen floor.  She was treated with bracing and had subsequent physical therapy.  DEXA dated 9/20/2021 significant for osteopenia with low FRAX risk for fracture.  DEXA dated 9/26/2023 significant for spine T-score -2.1 stable.  Total hip T-score -1.60.  Femoral neck -0.9 with an increase of 5.1% as compared to the prior study.  This may reflect positioning change.  FRAX risk for fracture with low risk for hip and 1.5% major fracture risk 10%.  Patient has no history of height loss or kidney stones.  She is being treated for hypothyroidism associated with Hashimoto's with positive TPO.  There is a family history of osteoporosis in her mother who sustained a wrist fracture.  Patient has seen a functional medicine nutritionist and is gluten and dairy free. She has noted increase in arthralgias first CMC joints with gardening. She hasn't yet gone for OT ordered at the last visit.  She has no significant morning stiffness.  She has occasional right-sided nonradicular low back pain, questionably related to a remote injury.  She has remote history of right heel pain.  She has dry eyes more so than dry mouth.  She had constipation which has resolved with dietary changes.  She does note urinary stress incontinence.  She has had ongoing sinus issues with rhino sinusitis and postnasal drip since August 2022.  She has scalp psoriasis and has seen dermatology who gave her a prescription for mometasone which she uses rarely as she does not needed.  She has been using a salicylic shampoo with benefit.  She had recent rash left ear treated with mometasone.  She has no history of heel pain.  She has history of GERD generally food related.  She has a history of watery diarrhea over the summer months which resolved with changing probiotics.  She has history of essential tremors left greater than right hand.  Patient did have an ER visit December 2023 for chest pain, with workup remarkable only for abnormal EKG.  She  was seen by cardiologist referred for echocardiogram which was nonrevealing.  Chest symptoms resolved.  Cardiac CT calcium score 0.  She is status post bilateral salpingo-oophorectomies for right adnexal mass which was benign on the pathologic specimen from surgery in January 2024.  Other medical history significant for anxiety depression, hyperlipidemia, hypertension, history remote recurrent UTI, and Hashimoto's thyroiditis.  She does have nonradicular low back pain, and has not yet pursued physical therapy ordered at the last office visit.  She has been too busy with gardening.    Lab work dated 10/29/2024 significant for fasting insulin 2.68.  TSH 1.775.  Hemoglobin A1c 5.9.  Calcium 8.7.  Creatinine 0.86 with estimated GFR 69.  Sed rate 29.  CRP 1.1.  White blood cell count 3.44 with absolute neutrophil count 1.77.  Vitamin D dated 9/9/2000 2459.  Review of lab work dated 9/5/2023 significant for CBC unremarkable.  Estimated GFR 64.  Free T41.07 With TSH 0.92.  Stool PCR was positive for E. coli in one of many samples tested. Review of lab work dated 3/22/2023 significant for white blood cell count 3.0 with absolute neutrophil count 1.5.  YO comprehensive profile negative.  Rheumatoid factor and CCP negative.  CK normal.  Lyme antibody negative.  Serum protein electrophoresis with immune O fixation normal.  Calcium 9.0.  Estimated GFR 73.  CRP 1.1.  PTH 33.1.  Urinalysis benign.    Updated DEXA dated 9/26/2023 significant for spine T-score -2.1.  Total hip T-score +0.9 with an increase of 5.1% as compared with prior study.  Femoral neck T-score -1.6 stable.  FRAX risk for hip fracture 2.3% with major fracture risk 10%.    X-rays dated 3/10/2023 significant for hands with severe first carpometacarpal joint osteoarthritis with moderate arthritis right triscaphe joint.  Lumbar spine minimal degenerative endplate changes with moderate to marked lower facet hypertrophic changes.  SI joints minimal bilateral  degenerative changes inferior SI joints with moderate bilateral hip osteoarthritis.        Allergies  Allergies   Allergen Reactions    Ciprofloxacin Other (See Comments)     Nightmares    Citrus - Food Allergy GI Intolerance    Dust Mite Extract Other (See Comments)    Gluten Meal - Food Allergy Other (See Comments)     Autoimmune issue     Milk-Related Compounds - Food Allergy Allergic Rhinitis     Dairy products    Molds & Smuts Other (See Comments)    Mushroom Extract Complex - Food Allergy Other (See Comments)    Shrimp Extract Allergy Skin Test - Food Allergy Other (See Comments)    Wheat Bran - Food Allergy Other (See Comments)       Home Medications    Current Outpatient Medications:     Ascorbic Acid (Vitamin C) 500 MG CAPS, Take by mouth, Disp: , Rfl:     B Complex Vitamins (B COMPLEX PO), Take by mouth, Disp: , Rfl:     Boswellia-Glucosamine-Vit D (OSTEO BI-FLEX ONE PER DAY PO), Take by mouth EASE, Disp: , Rfl:     CALCIUM CITRATE PO, Take by mouth, Disp: , Rfl:     Cholecalciferol (Vitamin D-3) 125 MCG (5000 UT) TABS, Take by mouth 6 days a week, Disp: , Rfl:     Coenzyme Q10 (COQ10 PO), Take by mouth, Disp: , Rfl:     levothyroxine 125 mcg tablet, TAKE 1/2 TABLET (62.5 MCG) BY MOUTH ONCE DAILY., Disp: , Rfl:     Magnesium 250 MG TABS, Take by mouth, Disp: , Rfl:     MILK THISTLE PO, Take by mouth, Disp: , Rfl:     NON FORMULARY, 2 (two) times a day Thytrophin PMG, Disp: , Rfl:     NON FORMULARY, in the morning MK-7, Disp: , Rfl:     NON FORMULARY, Nerve tonic, PRN, Disp: , Rfl:     NON FORMULARY, Place 1 Application on the skin Patient using Progest cream, Disp: , Rfl:     Omega-3 Fatty Acids (fish oil) 1,000 mg, Take 1 capsule by mouth daily, Disp: , Rfl:     Polyvinyl Alcohol-Povidone (REFRESH OP), Apply to eye if needed, Disp: , Rfl:     saccharomyces boulardii (FLORASTOR) 250 mg capsule, Take 250 mg by mouth in the morning, Disp: , Rfl:     sodium chloride (OCEAN) 0.65 % nasal spray, 1 spray into  each nostril as needed for congestion, Disp: , Rfl:     Turmeric Curcumin 500 MG CAPS, Take by mouth, Disp: , Rfl:     Zinc 20 MG CAPS, Take 40 mg by mouth, Disp: , Rfl:     NON FORMULARY, Take by mouth Kla[facility] labs multi-element Powder form (Patient not taking: Reported on 10/31/2024), Disp: , Rfl:     NON FORMULARY, 2 (two) times a day as needed Slippery elm bark (Patient not taking: Reported on 10/31/2024), Disp: , Rfl:     NON FORMULARY, 2 (two) times a day Stomach and intestinal health DGL (Patient not taking: Reported on 10/31/2024), Disp: , Rfl:     NON FORMULARY, in the morning Acid soothe (Patient not taking: Reported on 10/31/2024), Disp: , Rfl:     NON FORMULARY, 3 (three) times a day Reflux gourmet- paste (Patient not taking: Reported on 10/31/2024), Disp: , Rfl:     NON FORMULARY, Cold and sore throat relief (Patient not taking: Reported on 10/31/2024), Disp: , Rfl:     omeprazole (PriLOSEC) 20 mg delayed release capsule, Take 1 capsule (20 mg total) by mouth daily (Patient not taking: Reported on 1/3/2024), Disp: 7 capsule, Rfl: 0    simethicone (MYLICON) 125 MG chewable tablet, Chew 125 mg every 6 (six) hours as needed for flatulence (Patient not taking: Reported on 2/19/2024), Disp: , Rfl:     sucralfate (CARAFATE) 1 g tablet, Take 1 tablet (1 g total) by mouth 4 (four) times a day as needed (Take if you experience chest pain) for up to 5 days, Disp: 12 tablet, Rfl: 0    VITAMIN A PO, Take by mouth Once a week (Patient not taking: Reported on 10/31/2024), Disp: , Rfl:     Past Medical History  Past Medical History:   Diagnosis Date    Anesthesia complication     very sore throat after surgery in [facility]    Anxiety and depression     Dry eyes     Environmental and seasonal allergies     Fall     7/2022    GERD (gastroesophageal reflux disease)     Hyperlipidemia     Hypertension     Hypothyroidism     Hashimoto's    Lower extremity weakness     Osteoarthritis     Osteopenia     Psoriasis     Scalp     Sjogren's syndrome without extraglandular involvement (HCC)     Urinary, incontinence, stress female     UTI (urinary tract infection)        Past Surgical History   Past Surgical History:   Procedure Laterality Date    ADENOIDECTOMY      APPENDECTOMY N/A     BREAST MASS EXCISION Left     Benign    EGD AND COLONOSCOPY      12/2023    INGUINAL HERNIA REPAIR Left     MT LAPS TOTAL HYSTERECT 250 GM/< W/RMVL TUBE/OVARY N/A 1/12/2024    Procedure: ROBOT, BILATERAL SALPINGO-OOPHORECTOMY;  Surgeon: Marty Ha MD;  Location: [facility];  Service: Gynecology Oncology    TUBAL LIGATION         Family History   Family History   Problem Relation Age of Onset    Cervical cancer Mother     Hypothyroidism Mother     Migraines Mother     Heart disease Mother     Gallbladder disease Mother     Peripheral vascular disease Mother     Arthritis Mother     Osteoporosis Mother     Stroke Father     Hypertension Father     Depression Sister     Cancer Sister         Bladder    Hypertension Brother     Hyperlipidemia Brother     Coronary artery disease Brother     Psoriasis Brother     Hypertension Brother     Hyperlipidemia Brother        The following portions of the patient's history were reviewed and updated as appropriate: allergies, current medications, past family history, past medical history, past social history, past surgical history, and problem list.    Review of Systems   Constitutional:  Negative for chills and fever.   HENT:  Positive for sinus pressure. Negative for ear pain and sore throat.         Dry mouth  PND  Rhinosinusitis   Eyes:  Negative for pain and visual disturbance.        Dry eyes   Respiratory:  Negative for cough and shortness of breath.    Cardiovascular:  Negative for chest pain and palpitations.   Gastrointestinal:  Negative for abdominal pain and vomiting.        GERD generally stable, rare sx's with different foods  Hx watery diarrhea 2-3 times in am from July until 2 weeks ago, sx's  "improving. She is on different probiotic.   Genitourinary:  Negative for dysuria and hematuria.        Urinary stress incontinence   Musculoskeletal:  Positive for arthralgias. Negative for back pain.        Thumb arthralgias  Right ankle arthralgias occas  Remote heel pain   Skin:  Negative for color change and rash.        Psoriasis scalp  Dry skin   Allergic/Immunologic: Positive for environmental allergies and food allergies.   Neurological:  Positive for tremors. Negative for seizures and syncope.        Essential tremors  Dizziness with congested sinuses   Psychiatric/Behavioral:          Anxiety/depression   All other systems reviewed and are negative.        Objective:      /74   Pulse 64   Temp (!) 97.2 °F (36.2 °C) (Temporal)   Ht 5' 4\" (1.626 m)   Wt 68 kg (150 lb)   SpO2 96%   BMI 25.75 kg/m²          Physical Exam  Vitals reviewed.   Constitutional:       Appearance: Normal appearance.   HENT:      Head: Normocephalic.      Nose:      Comments: Nose and throat unremarkable.  Eyes:      Extraocular Movements: Extraocular movements intact.   Neck:      Comments: Without masses, thyromegaly, lymphadenopathy  Cardiovascular:      Rate and Rhythm: Regular rhythm.   Pulmonary:      Breath sounds: Normal breath sounds.   Abdominal:      Palpations: Abdomen is soft.   Musculoskeletal:      Cervical back: Neck supple.      Comments: Neck decreased lateral flexion limitation.  Shoulders full range of motion as well as the wrists.  Tinel signs negative bilaterally.  Squaring first carpometacarpal joint bilaterally right greater than left with scattered early Heberden's nodes.  No synovitis noted.  Straight leg raising negative bilaterally.  Schober's negative.  No SI joint tenderness appreciated.  Full range of motion.  Knees full range of motion with patellofemoral crepitus.  Ankles soft tissue prominence with no synovitis.  Feet rear foot hyperpronation with midfoot cavus deformities,  hallux valgus, " and scattered hammertoe deformities.  No synovitis noted.  No dactylitis.  No tenderness plantar fascia.   Skin:     General: Skin is warm.      Comments: Minimal varicosities ankles.  Plaque psoriasis base of scalp.  No onycholysis.    Neurological:      General: No focal deficit present.      Coordination: Abnormal coordination: urinary stress incont.               This note was written in part using the assistance of the [employer] [id number] microphone system. Those portions using this system have been dictated and not read.

## 2024-11-03 NOTE — ASSESSMENT & PLAN NOTE
Dry eyes likely secondary to autoimmune thyroid disease/secondary Sjogren's.  Chronic dry eyes and dry mouth.  She does note dryness in her nose.  Encourage saline nasal spray for symptomatic relief.  Encourage eyedrops as needed for dry eyes.  Encourage vigilant dental hygiene and follow-up dentist every 6 months.  Continue to monitor.

## 2024-11-03 NOTE — ASSESSMENT & PLAN NOTE
Follow up DEXA dated 9/26/2023 significant for osteopenia with low FRAX fracture risk.  History closed avulsion fracture lateral malleolus traumatic, not considered an osteoporotic fracture.  Metabolic workup unremarkable. No history of osteoporotic fracture.  Monitor DEXA every 2 years.  She will be due in late September 2025.  Continue calcium and vitamin D supplement.  Continue home exercise program as tolerated.  Follow-up in October 2025 to review results of DEXA. Continue to monitor.

## 2024-11-03 NOTE — ASSESSMENT & PLAN NOTE
X-rays significant for lumbar spondylosis with no evidence for inflammatory back disease.  SI joints with no evidence of sacroiliitis.  She has not gone for physical therapy ordered at the time of the last visit.  She feels that her current symptoms are related to too much guarding.  I did give her a recommendation for a medical massage therapist, who she seemed interested in going to, and likely she would provide benefit for her ongoing symptoms.  Patient will call should she decide to go for formal physical therapy.  Continue to monitor.

## 2024-11-03 NOTE — ASSESSMENT & PLAN NOTE
Primary generalized osteoarthritis with interphalangeal osteoarthritis hands and feet, and cervical and lumbar spine spondylosis.  No evidence for inflammatory arthritis or connective tissue disease by x-rays or lab work.  She has not gone for PT recommended at the last office visit.  She did seem to be interested in formal medical muscle massage instead, and I have given her contact information for an excellent therapist.  Plan follow-up in 12 months or sooner if needed.  Patient will call should she decide that she would like to go for formal physical therapy. Continue to monitor.

## 2024-11-03 NOTE — ASSESSMENT & PLAN NOTE
No evidence for inflammatory arthropathy or connective tissue disease syndrome by labs or imaging or clinical history and physical exam.  No evidence for psoriatic arthritis either.  Osteoarthritis significant first CMC joints bilaterally. She has not gone back for additional OT recommended at the prior visit.  Could consider Depo steroid injections if symptoms worsen.  Follow-up 12 months or earlier if needed.

## 2024-11-03 NOTE — ASSESSMENT & PLAN NOTE
Hashimoto's thyroiditis with positive TPO antibody on levothyroxine.  Monitor thyroid function studies every 6 to 12 months per primary  care physician.  Encourage continuation of gluten-free diet in view of association of gluten sensitivity in patients with Hashimoto's thyroiditis.  Encourage the anti-inflammatory diet which would be helpful for GI symptoms as well.  Continue to monitor.

## 2024-11-03 NOTE — ASSESSMENT & PLAN NOTE
Plaque psoriasis base of scalp with remote history heel pain.  With no evidence clinically for psoriatic arthritis.  She did follow-up with derm for scalp Psoriasis and was given a prescription for mometasone.  She did have recent skin involvement left ear which responded to topical steroids. Continue to monitor for signs and symptoms of psoriatic arthritis.  Follow-up dermatology as needed.

## 2024-11-12 ENCOUNTER — TELEPHONE (OUTPATIENT)
Age: 69
End: 2024-11-12

## 2025-05-09 ENCOUNTER — TELEPHONE (OUTPATIENT)
Dept: INTERNAL MEDICINE CLINIC | Facility: CLINIC | Age: 70
End: 2025-05-09

## 2025-08-05 ENCOUNTER — OFFICE VISIT (OUTPATIENT)
Dept: INTERNAL MEDICINE CLINIC | Facility: CLINIC | Age: 70
End: 2025-08-05
Payer: MEDICARE

## 2025-08-05 VITALS
OXYGEN SATURATION: 98 % | HEART RATE: 71 BPM | WEIGHT: 149 LBS | BODY MASS INDEX: 25.58 KG/M2 | SYSTOLIC BLOOD PRESSURE: 104 MMHG | DIASTOLIC BLOOD PRESSURE: 70 MMHG

## 2025-08-05 DIAGNOSIS — M85.89 OSTEOPENIA OF MULTIPLE SITES: ICD-10-CM

## 2025-08-05 DIAGNOSIS — M47.816 LUMBAR SPONDYLOSIS: ICD-10-CM

## 2025-08-05 DIAGNOSIS — E06.3 HYPOTHYROIDISM DUE TO HASHIMOTO'S THYROIDITIS: ICD-10-CM

## 2025-08-05 DIAGNOSIS — M25.512 ACUTE PAIN OF LEFT SHOULDER: ICD-10-CM

## 2025-08-05 DIAGNOSIS — K21.9 GASTROESOPHAGEAL REFLUX DISEASE WITHOUT ESOPHAGITIS: ICD-10-CM

## 2025-08-05 DIAGNOSIS — I10 PRIMARY HYPERTENSION: ICD-10-CM

## 2025-08-05 DIAGNOSIS — Z90.722 HISTORY OF BILATERAL SALPINGO-OOPHORECTOMY (BSO): ICD-10-CM

## 2025-08-05 DIAGNOSIS — M06.09 SERONEGATIVE ARTHROPATHY OF MULTIPLE SITES (HCC): ICD-10-CM

## 2025-08-05 DIAGNOSIS — Z90.79 HISTORY OF BILATERAL SALPINGO-OOPHORECTOMY (BSO): ICD-10-CM

## 2025-08-05 DIAGNOSIS — Z00.00 MEDICARE ANNUAL WELLNESS VISIT, INITIAL: Primary | ICD-10-CM

## 2025-08-05 PROCEDURE — G0438 PPPS, INITIAL VISIT: HCPCS | Performed by: INTERNAL MEDICINE

## 2025-08-05 RX ORDER — LEVOTHYROXINE SODIUM 75 UG/1
TABLET ORAL
COMMUNITY
Start: 2025-07-20

## 2025-08-06 ENCOUNTER — TELEPHONE (OUTPATIENT)
Dept: INTERNAL MEDICINE CLINIC | Facility: CLINIC | Age: 70
End: 2025-08-06

## 2025-08-06 ENCOUNTER — EVALUATION (OUTPATIENT)
Dept: PHYSICAL THERAPY | Facility: CLINIC | Age: 70
End: 2025-08-06
Attending: INTERNAL MEDICINE
Payer: MEDICARE

## 2025-08-06 DIAGNOSIS — M47.816 LUMBAR SPONDYLOSIS: ICD-10-CM

## 2025-08-06 DIAGNOSIS — M25.512 ACUTE PAIN OF LEFT SHOULDER: ICD-10-CM

## 2025-08-06 PROCEDURE — 97161 PT EVAL LOW COMPLEX 20 MIN: CPT

## 2025-08-06 PROCEDURE — 97535 SELF CARE MNGMENT TRAINING: CPT

## 2025-08-08 ENCOUNTER — OFFICE VISIT (OUTPATIENT)
Dept: PHYSICAL THERAPY | Facility: CLINIC | Age: 70
End: 2025-08-08
Attending: INTERNAL MEDICINE
Payer: MEDICARE

## 2025-08-08 DIAGNOSIS — M25.512 ACUTE PAIN OF LEFT SHOULDER: ICD-10-CM

## 2025-08-08 DIAGNOSIS — M47.816 LUMBAR SPONDYLOSIS: Primary | ICD-10-CM

## 2025-08-08 PROCEDURE — 97110 THERAPEUTIC EXERCISES: CPT

## 2025-08-08 PROCEDURE — 97535 SELF CARE MNGMENT TRAINING: CPT

## 2025-08-08 PROCEDURE — 97112 NEUROMUSCULAR REEDUCATION: CPT

## 2025-08-12 ENCOUNTER — TELEPHONE (OUTPATIENT)
Dept: ADMINISTRATIVE | Facility: OTHER | Age: 70
End: 2025-08-12

## 2025-08-15 ENCOUNTER — OFFICE VISIT (OUTPATIENT)
Dept: PHYSICAL THERAPY | Facility: CLINIC | Age: 70
End: 2025-08-15
Attending: INTERNAL MEDICINE
Payer: MEDICARE

## 2025-08-19 ENCOUNTER — OFFICE VISIT (OUTPATIENT)
Dept: PHYSICAL THERAPY | Facility: CLINIC | Age: 70
End: 2025-08-19
Attending: INTERNAL MEDICINE
Payer: MEDICARE

## 2025-08-19 DIAGNOSIS — M25.512 ACUTE PAIN OF LEFT SHOULDER: ICD-10-CM

## 2025-08-19 DIAGNOSIS — M47.816 LUMBAR SPONDYLOSIS: Primary | ICD-10-CM

## 2025-08-19 PROCEDURE — 97110 THERAPEUTIC EXERCISES: CPT

## 2025-08-19 PROCEDURE — 97112 NEUROMUSCULAR REEDUCATION: CPT

## 2025-08-21 ENCOUNTER — OFFICE VISIT (OUTPATIENT)
Dept: PHYSICAL THERAPY | Facility: CLINIC | Age: 70
End: 2025-08-21
Attending: INTERNAL MEDICINE
Payer: MEDICARE

## 2025-08-21 DIAGNOSIS — M47.816 LUMBAR SPONDYLOSIS: Primary | ICD-10-CM

## 2025-08-21 DIAGNOSIS — M25.512 ACUTE PAIN OF LEFT SHOULDER: ICD-10-CM

## 2025-08-21 PROCEDURE — 97140 MANUAL THERAPY 1/> REGIONS: CPT

## 2025-08-21 PROCEDURE — 97110 THERAPEUTIC EXERCISES: CPT

## (undated) DEVICE — SYRINGE 10ML LL CONTROL TOP

## (undated) DEVICE — ELECTRO LUBE IS A SINGLE PATIENT USE DEVICE THAT IS INTENDED TO BE USED ON ELECTROSURGICAL ELECTRODES TO REDUCE STICKING.: Brand: KEY SURGICAL ELECTRO LUBE

## (undated) DEVICE — BETHLEHEM UNIVERSAL LAPAROTOMY: Brand: CARDINAL HEALTH

## (undated) DEVICE — SCD SEQUENTIAL COMPRESSION COMFORT SLEEVE MEDIUM KNEE LENGTH: Brand: KENDALL SCD

## (undated) DEVICE — CANNULA SEAL

## (undated) DEVICE — AIRSEAL TUBE SMOKE EVAC LUMENX3 FILTERED

## (undated) DEVICE — MONOPOLAR CURVED SCISSORS: Brand: ENDOWRIST

## (undated) DEVICE — SPECIMEN CONTAINER STERILE PEEL PACK

## (undated) DEVICE — KIT, BETHLEHEM ROBOTIC PROST: Brand: CARDINAL HEALTH

## (undated) DEVICE — INTENDED FOR TISSUE SEPARATION, AND OTHER PROCEDURES THAT REQUIRE A SHARP SURGICAL BLADE TO PUNCTURE OR CUT.: Brand: BARD-PARKER SAFETY BLADES SIZE 10, STERILE

## (undated) DEVICE — DRAPE LAPAROTOMY W/POUCHES

## (undated) DEVICE — TRAY FOLEY 16FR URIMETER SILICONE SURESTEP

## (undated) DEVICE — TROCAR PORT ACCESS 8 X100MML W BLDLS OPTICAL TIP AIRSEAL

## (undated) DEVICE — SUT VICRYL 3-0 SH 27 IN J416H

## (undated) DEVICE — MAYO STAND COVER: Brand: CONVERTORS

## (undated) DEVICE — SUT MONOCRYL 4-0 PS-2 27 IN Y426H

## (undated) DEVICE — SUT PDS II 1 TP-1 96 IN Z880G

## (undated) DEVICE — ARM DRAPE

## (undated) DEVICE — GLOVE INDICATOR PI UNDERGLOVE SZ 8 BLUE

## (undated) DEVICE — INVIEW CLEAR LEGGINGS: Brand: CONVERTORS

## (undated) DEVICE — BLADELESS OBTURATOR: Brand: WECK VISTA

## (undated) DEVICE — ABDOMINAL PAD: Brand: DERMACEA

## (undated) DEVICE — SUT VICRYL 0 UR-6 27 IN J603H

## (undated) DEVICE — DRAPE EQUIPMENT RF WAND

## (undated) DEVICE — TUBING SUCTION 5MM X 12 FT

## (undated) DEVICE — SUT STRATAFIX SPIRAL 2-0 CT-1 30 CM SXPP1B410

## (undated) DEVICE — 1820 FOAM BLOCK NEEDLE COUNTER: Brand: DEVON

## (undated) DEVICE — SUT VICRYL 0 CT-1 36 IN J946H

## (undated) DEVICE — COLUMN DRAPE

## (undated) DEVICE — NEEDLE 25G X 1 1/2

## (undated) DEVICE — CHLORAPREP HI-LITE 26ML ORANGE

## (undated) DEVICE — TISSUE RETRIEVAL SYSTEM: Brand: INZII RETRIEVAL SYSTEM

## (undated) DEVICE — POOLE SUCTION HANDLE: Brand: CARDINAL HEALTH

## (undated) DEVICE — ELECTRODE LAP SPATULA STR E-Z CLEAN 33CM -0018

## (undated) DEVICE — DRAPE FLUID WARMER (BIRD BATH)

## (undated) DEVICE — PROXIMATE SKIN STAPLERS (35 WIDE) CONTAINS 35 STAINLESS STEEL STAPLES (FIXED HEAD): Brand: PROXIMATE

## (undated) DEVICE — TRAP,MUCUS SPECIMEN, 80CC: Brand: MEDLINE

## (undated) DEVICE — UNDER BUTTOCKS DRAPE: Brand: CONVERTORS

## (undated) DEVICE — EXIDINE 4 PCT

## (undated) DEVICE — ADHESIVE SKIN HIGH VISCOSITY EXOFIN 1ML

## (undated) DEVICE — INTENDED FOR TISSUE SEPARATION, AND OTHER PROCEDURES THAT REQUIRE A SHARP SURGICAL BLADE TO PUNCTURE OR CUT.: Brand: BARD-PARKER SAFETY BLADES SIZE 15, STERILE

## (undated) DEVICE — SEPRA FILM 6 X 5

## (undated) DEVICE — GLOVE PI ULTRA TOUCH SZ.7.5

## (undated) DEVICE — TIP COVER ACCESSORY

## (undated) DEVICE — PROGRASP FORCEPS: Brand: ENDOWRIST

## (undated) DEVICE — LIGHT GLOVE GREEN

## (undated) DEVICE — SUT VICRYL 0 REEL 54 IN J287G

## (undated) DEVICE — 3000CC GUARDIAN II: Brand: GUARDIAN

## (undated) DEVICE — SPONGE STICK WITH PVP-I: Brand: KENDALL

## (undated) DEVICE — SYRINGE 50ML LL

## (undated) DEVICE — TELFA NON-ADHERENT ABSORBENT DRESSING: Brand: TELFA

## (undated) DEVICE — INTENDED FOR TISSUE SEPARATION, AND OTHER PROCEDURES THAT REQUIRE A SHARP SURGICAL BLADE TO PUNCTURE OR CUT.: Brand: BARD-PARKER SAFETY BLADES SIZE 11, STERILE

## (undated) DEVICE — 40595 XL TRENDELENBURG POSITIONING KIT: Brand: 40595 XL TRENDELENBURG POSITIONING KIT

## (undated) DEVICE — TROCAR PORT ACCESS 12 X120MM W/BLDLS OPTICAL TIP AIRSEAL

## (undated) DEVICE — FENESTRATED BIPOLAR FORCEPS: Brand: ENDOWRIST

## (undated) DEVICE — GLOVE INDICATOR PI UNDERGLOVE SZ 7 BLUE

## (undated) DEVICE — MEDI-VAC YANKAUER SUCTION HANDLE W/BULBOUS AND CONTROL VENT: Brand: CARDINAL HEALTH

## (undated) DEVICE — OCCLUDER COLPO-PNEUMO

## (undated) DEVICE — PREMIUM DRY TRAY LF: Brand: MEDLINE INDUSTRIES, INC.

## (undated) DEVICE — LARGE NEEDLE DRIVER: Brand: ENDOWRIST